# Patient Record
Sex: MALE | Race: WHITE | URBAN - METROPOLITAN AREA
[De-identification: names, ages, dates, MRNs, and addresses within clinical notes are randomized per-mention and may not be internally consistent; named-entity substitution may affect disease eponyms.]

---

## 2018-12-26 ENCOUNTER — NEW PATIENT (OUTPATIENT)
Dept: URBAN - METROPOLITAN AREA CLINIC 44 | Facility: CLINIC | Age: 68
End: 2018-12-26
Payer: MEDICARE

## 2018-12-26 DIAGNOSIS — S05.01XA INJ CONJUNCTIVA AND CORNEAL ABRASION W/O FB, RIGHT EYE, INIT: ICD-10-CM

## 2018-12-26 PROCEDURE — 92002 INTRM OPH EXAM NEW PATIENT: CPT | Performed by: OPHTHALMOLOGY

## 2018-12-26 RX ORDER — OFLOXACIN 3 MG/ML
0.3 % SOLUTION/ DROPS OPHTHALMIC
Qty: 1 | Refills: 1 | Status: ACTIVE
Start: 2018-12-26

## 2019-01-03 ENCOUNTER — FOLLOW UP ESTABLISHED (OUTPATIENT)
Dept: URBAN - METROPOLITAN AREA CLINIC 24 | Facility: CLINIC | Age: 69
End: 2019-01-03
Payer: MEDICARE

## 2019-01-03 DIAGNOSIS — S05.01XD INJ CONJUNCTIVA AND CORNEAL ABRASION W/O FB, RIGHT EYE, SUBS: Primary | ICD-10-CM

## 2019-01-03 DIAGNOSIS — H16.041 MARGINAL CORNEAL ULCER, RIGHT EYE: ICD-10-CM

## 2019-01-03 PROCEDURE — 92012 INTRM OPH EXAM EST PATIENT: CPT | Performed by: OPHTHALMOLOGY

## 2019-03-19 ENCOUNTER — HOSPITAL ENCOUNTER (OUTPATIENT)
Dept: MRI IMAGING | Facility: HOSPITAL | Age: 69
Discharge: HOME | End: 2019-03-19
Attending: OPHTHALMOLOGY
Payer: MEDICARE

## 2019-03-19 ENCOUNTER — HOSPITAL ENCOUNTER (OUTPATIENT)
Dept: GENERAL RADIOLOGY | Facility: HOSPITAL | Age: 69
Discharge: HOME | End: 2019-03-19
Attending: OPHTHALMOLOGY
Payer: MEDICARE

## 2019-03-19 DIAGNOSIS — H35.82 OCULAR ISCHEMIC SYNDROME: ICD-10-CM

## 2019-03-19 DIAGNOSIS — H53.131 SUDDEN LOSS OF VISION, RIGHT: ICD-10-CM

## 2019-03-19 DIAGNOSIS — G45.3 AMAUROSIS FUGAX: ICD-10-CM

## 2019-03-19 DIAGNOSIS — Z13.89 ENCOUNTER FOR IMAGING TO SCREEN FOR METAL PRIOR TO MRI: ICD-10-CM

## 2019-03-19 PROCEDURE — 70030 X-RAY EYE FOR FOREIGN BODY: CPT | Performed by: OPHTHALMOLOGY

## 2019-03-19 PROCEDURE — A9575 INJ GADOTERATE MEGLUMI 0.1ML: HCPCS | Performed by: RADIOLOGY

## 2019-03-19 PROCEDURE — 70549 MR ANGIOGRAPH NECK W/O&W/DYE: CPT | Performed by: OPHTHALMOLOGY

## 2019-03-19 PROCEDURE — 70544 MR ANGIOGRAPHY HEAD W/O DYE: CPT | Performed by: OPHTHALMOLOGY

## 2019-04-23 ENCOUNTER — TELEPHONE (OUTPATIENT)
Dept: SURGERY | Facility: CLINIC | Age: 69
End: 2019-04-23

## 2019-04-23 ENCOUNTER — OFFICE VISIT (OUTPATIENT)
Dept: SURGERY | Facility: CLINIC | Age: 69
End: 2019-04-23
Payer: MEDICARE

## 2019-04-23 VITALS
BODY MASS INDEX: 26.52 KG/M2 | HEIGHT: 66 IN | HEART RATE: 64 BPM | WEIGHT: 165 LBS | DIASTOLIC BLOOD PRESSURE: 76 MMHG | SYSTOLIC BLOOD PRESSURE: 122 MMHG

## 2019-04-23 DIAGNOSIS — I65.21 SYMPTOMATIC STENOSIS OF RIGHT CAROTID ARTERY WITHOUT INFARCTION: Primary | ICD-10-CM

## 2019-04-23 PROCEDURE — 99205 OFFICE O/P NEW HI 60 MIN: CPT

## 2019-04-23 RX ORDER — ATORVASTATIN CALCIUM 20 MG/1
20 TABLET, FILM COATED ORAL NIGHTLY
Qty: 30 TABLET | Refills: 3 | Status: SHIPPED | OUTPATIENT
Start: 2019-04-23 | End: 2019-09-30

## 2019-04-23 RX ORDER — CLOPIDOGREL BISULFATE 75 MG/1
75 TABLET ORAL DAILY
Qty: 30 TABLET | Refills: 3 | Status: SHIPPED | OUTPATIENT
Start: 2019-04-23 | End: 2019-09-29

## 2019-04-23 RX ORDER — ASPIRIN 81 MG/1
162 TABLET ORAL DAILY
Status: ON HOLD | COMMUNITY
End: 2019-07-14

## 2019-04-23 NOTE — TELEPHONE ENCOUNTER
Please keep for follow up:    Patient needs to have the following imaging completed: Carotid US, CTA Brain + CTA carotids, MRI brain + perfusion.       Once imaging has been completed please send patient to information with name, , diagnosis and MR to ER

## 2019-04-23 NOTE — TELEPHONE ENCOUNTER
\"We will reconvene with the patient after these imaging studies are completed and we have obtained consensus for the optimum treatment strategy at our multidisciplinary. \"    Patient informed to call to get imaging scheduled that was ordered at today's vi

## 2019-04-23 NOTE — PROGRESS NOTES
[facility]  Interventional Neuroradiology Clinic Note      Paty Leiva MD  Ophthalmology  Miranda Milian MD  Neurology  [address] [place], [place]  Primary Care      Date of Service: 4/23/2019 artery disease) 12/4/2006    3/19/2002 mild, s/p left heard catheterization, coronary angiography, left ventriculography   • Carotid artery stenosis 9/19/2006    Rt sided, at proximal and internal carotid artery, 30% involving bulb and internal carotid art tympanic membrane 10/15/2007   • Pneumonia    • Psoriasis    • Renal calculi 8/6/10    bilat, non obstructing, nephrolithiasis   • Ureterolithiasis 5/27/2008    with hydronephrosis on Lt, 5/27/2008 Lt distal ureteral calculus, s/p cystoscopy, Lt ureterosco #8/day, Disp: 240 tablet, Rfl: 0  •  aspirin EC 81 MG Oral Tab EC, Take by mouth., Disp: , Rfl:   •  DURAGESIC-100 100 MCG/HR TD PT72, 1 patch every 2 days for 30 days, 15 units- please substitue for generic, Disp: 15, Rfl: 0    Allergies:   [place] cerebrovascular risk factors including active smoking, hyperlipidemia, CAD, PVD, and cervical fusion neck surgery presents with recurrent episodes of amaurosis fugax secondary to suspected severe >70% right carotid stenosis on MRA neck study on 3/19/19. 75 mg daily, to prevent any thromboembolic or hypoperfusion related complications as well as potentially to resolve his transient amaurosis fugax symptoms.   Furthermore, we have initiated atorvastatin 20 mg daily therapy and referred him for long-term risk

## 2019-04-23 NOTE — TELEPHONE ENCOUNTER
Pt unsure what appts he needs to set up (testing, FU, etc), physician's notes not yet complete, no check out notes, please advise. Pt gives permission to leave message on his cell.

## 2019-04-23 NOTE — PROGRESS NOTES
PT here  for abnormal MRI / MRA carotids of the [facility]. Since January has been experiencing vision loss.        Review of Systems:      Hand Dominance: right  General: no symptoms reported  Neuro: no symptoms reported  Head: ringing in ears  Musculos

## 2019-04-26 ENCOUNTER — HOSPITAL ENCOUNTER (OUTPATIENT)
Dept: ULTRASOUND IMAGING | Facility: HOSPITAL | Age: 69
Discharge: HOME | End: 2019-04-26
Payer: MEDICARE

## 2019-04-26 ENCOUNTER — HOSPITAL ENCOUNTER (OUTPATIENT)
Dept: CT IMAGING | Facility: HOSPITAL | Age: 69
Discharge: HOME | End: 2019-04-26
Payer: MEDICARE

## 2019-04-26 DIAGNOSIS — I65.21 SYMPTOMATIC STENOSIS OF RIGHT CAROTID ARTERY WITHOUT INFARCTION: ICD-10-CM

## 2019-04-26 PROCEDURE — 70496 CT ANGIOGRAPHY HEAD: CPT

## 2019-04-26 PROCEDURE — 93880 EXTRACRANIAL BILAT STUDY: CPT

## 2019-04-26 PROCEDURE — 70498 CT ANGIOGRAPHY NECK: CPT

## 2019-04-26 PROCEDURE — 82565 ASSAY OF CREATININE: CPT

## 2019-04-30 ENCOUNTER — HOSPITAL ENCOUNTER (OUTPATIENT)
Dept: MRI IMAGING | Facility: HOSPITAL | Age: 69
Discharge: HOME | End: 2019-04-30
Payer: MEDICARE

## 2019-04-30 DIAGNOSIS — I65.21 SYMPTOMATIC STENOSIS OF RIGHT CAROTID ARTERY WITHOUT INFARCTION: ICD-10-CM

## 2019-04-30 PROCEDURE — 70553 MRI BRAIN STEM W/O & W/DYE: CPT

## 2019-04-30 PROCEDURE — A9575 INJ GADOTERATE MEGLUMI 0.1ML: HCPCS

## 2019-05-09 NOTE — TELEPHONE ENCOUNTER
Patient completed imaging.  Message sent to Darcie Rodriguez at [facility] to be added on to the conference discussion list.

## 2019-05-15 ENCOUNTER — TELEPHONE (OUTPATIENT)
Dept: SURGERY | Facility: CLINIC | Age: 69
End: 2019-05-15

## 2019-05-15 NOTE — TELEPHONE ENCOUNTER
Pt wants results. Now has lost hearing, has low buzzing, constant. Would like someone to call him with the results. Dr. Baldo Mauro asked him at the visit about his hearing so he is concerned. Please call this afternoon [phone].

## 2019-05-15 NOTE — TELEPHONE ENCOUNTER
Called patient back regarding hearing loss. He stated he has had some hearing loss to his left ear for the past hour along with low humming.   He is concerned because Dr. Melecio Pang had asked him about his hearing at his last visit and at that time, he was not

## 2019-05-16 ENCOUNTER — TELEPHONE (OUTPATIENT)
Dept: SURGERY | Facility: CLINIC | Age: 69
End: 2019-05-16

## 2019-05-16 NOTE — TELEPHONE ENCOUNTER
ANTELMO. Spoke with Lacho [place] who stated patient to f/u next available to discuss results and recommendations.

## 2019-05-16 NOTE — TELEPHONE ENCOUNTER
Pt called on call  last night, pt was having hearing issues, humming, then muscles. Then pt was having numbness in face on L side for a couple of minutes.   Pt states he still doesn't have  Test results & Dr Elisabeth Lu next available is not until 6/4/19

## 2019-05-16 NOTE — TELEPHONE ENCOUNTER
HI, Please call and arrange for patient to be seen in clinic with Dr. Zeus Holden. We will evaluated symptoms and also discuss with patient conference recommendations.     Thanks

## 2019-05-22 ENCOUNTER — OFFICE VISIT (OUTPATIENT)
Dept: SURGERY | Facility: CLINIC | Age: 69
End: 2019-05-22
Payer: MEDICARE

## 2019-05-22 ENCOUNTER — TELEPHONE (OUTPATIENT)
Dept: SURGERY | Facility: CLINIC | Age: 69
End: 2019-05-22

## 2019-05-22 VITALS — HEART RATE: 64 BPM | SYSTOLIC BLOOD PRESSURE: 108 MMHG | DIASTOLIC BLOOD PRESSURE: 70 MMHG | RESPIRATION RATE: 18 BRPM

## 2019-05-22 DIAGNOSIS — I65.21 SYMPTOMATIC STENOSIS OF RIGHT CAROTID ARTERY WITHOUT INFARCTION: Primary | ICD-10-CM

## 2019-05-22 DIAGNOSIS — Z01.818 PRE-OP TESTING: ICD-10-CM

## 2019-05-22 PROCEDURE — 99215 OFFICE O/P EST HI 40 MIN: CPT

## 2019-05-22 NOTE — TELEPHONE ENCOUNTER
Schedule patient for a Carotid Angiogram with stenting. Will need PA due to high risk procedure with Medicare. Schedule appointment for first available.

## 2019-05-22 NOTE — PROGRESS NOTES
[facility]  Interventional Neuroradiology Clinic Note        Gen Weldon MD  Ophthalmology  [facility] RAINA Alfonso MD  Neurology  [address] [place]  Primary Care        Date of Service: 5/22/2 starting clopidogrel, his amauraosis fugax has resolved. However, he describes episode of left facial numbness/tingling  that lasted for 1 minute last week.  Additionally, he noted left sided high pitched non-pulsatile tinnitus and some subjective hearing l chronic venous insufficiency with corona phlebectatica, hyperpigmentation, ankle swelling, dermatitis, lipodermatosclerosis, and healed ulceration   • CRPS (complex regional pain syndrome type II) 9/10/2010   • Crush injury 1999     Lt thigh   • Depression UNLISTED         20 surgeries on left leg, 11/2005 incision and debridement Lt leg wound, 1999 Lt leg I&D   • Cystoscopy, Lt ureteroscopy, stone extraction, Lt ureteral stent insertion   5/27/08   • Left leg abcess removal       • Cystoscopy, stent removal of carotid bruits, lymphadenopathy, or masses. The thyroid is midline.   The patient is  alert, awake, and oriented with normal intelligence, memory, language, and judgement.  On cranial nerve examination, full visual fields are noted by confrontational tian clopidogrel, we will attempt to transition him to ticagrelor or prasugrel.      We again counseled the patient on the  benefits of carotid revascularization for symptomatic > 70% stenoses based on the NASCET trial, ~17% absolute risk reduction of ipsilater ischemic/hemorrhagic stroke prevention including monitoring for hypertension and diabetes, continuing dual antiplatelets, hydration, and statin anticholesterolemic therapy.  Both aspirin and statins may have inherent anti-inflammatory effects in stabilizing

## 2019-05-24 NOTE — TELEPHONE ENCOUNTER
Patient returned our call and decided to take June 27th at 9:00 am for his surgery. While speaking to patient he was in the middle of driving and asked that we alice him back later to go over surgery intructions.

## 2019-05-24 NOTE — TELEPHONE ENCOUNTER
LMTCB. Patient to be scheduled for the below procedure and review instructions.     Patient scheduled for carotid angiogram with stenting on date/time TBD with Dr. Bella Dee.     Discussed instructions with patient      · Nothing to eat or drink after midnight

## 2019-05-28 NOTE — TELEPHONE ENCOUNTER
Called patient to give him instructions for his surgery. Also, sent patient a lync to set up MyChart.   Surgery scheduled for June 27th at 9:00 am             Letter sent to PCP  Is a current everyday smoker  Medicare, PA not required

## 2019-05-29 NOTE — TELEPHONE ENCOUNTER
Patient has appointment scheduled for 6/4/19 for medical clearance. Will check back at that time for status of clearance.

## 2019-06-07 ENCOUNTER — HOSPITAL ENCOUNTER (OUTPATIENT)
Dept: GENERAL RADIOLOGY | Facility: HOSPITAL | Age: 69
Discharge: HOME | End: 2019-06-07
Payer: MEDICARE

## 2019-06-07 ENCOUNTER — LAB ENCOUNTER (OUTPATIENT)
Dept: LAB | Facility: HOSPITAL | Age: 69
End: 2019-06-07
Payer: MEDICARE

## 2019-06-07 DIAGNOSIS — I65.21 SYMPTOMATIC STENOSIS OF RIGHT CAROTID ARTERY WITHOUT INFARCTION: ICD-10-CM

## 2019-06-07 DIAGNOSIS — Z01.818 PRE-OP TESTING: ICD-10-CM

## 2019-06-07 DIAGNOSIS — Z00.00 ROUTINE GENERAL MEDICAL EXAMINATION AT A HEALTH CARE FACILITY: ICD-10-CM

## 2019-06-07 DIAGNOSIS — E03.9 PRIMARY HYPOTHYROIDISM: ICD-10-CM

## 2019-06-07 DIAGNOSIS — E78.5 HYPERLIPIDEMIA, UNSPECIFIED HYPERLIPIDEMIA TYPE: ICD-10-CM

## 2019-06-07 PROCEDURE — 36415 COLL VENOUS BLD VENIPUNCTURE: CPT

## 2019-06-07 PROCEDURE — 80061 LIPID PANEL: CPT

## 2019-06-07 PROCEDURE — 85610 PROTHROMBIN TIME: CPT

## 2019-06-07 PROCEDURE — 84439 ASSAY OF FREE THYROXINE: CPT

## 2019-06-07 PROCEDURE — 80053 COMPREHEN METABOLIC PANEL: CPT

## 2019-06-07 PROCEDURE — 85576 BLOOD PLATELET AGGREGATION: CPT

## 2019-06-07 PROCEDURE — 85025 COMPLETE CBC W/AUTO DIFF WBC: CPT

## 2019-06-07 PROCEDURE — 71046 X-RAY EXAM CHEST 2 VIEWS: CPT

## 2019-06-07 PROCEDURE — 84443 ASSAY THYROID STIM HORMONE: CPT

## 2019-06-07 PROCEDURE — 85730 THROMBOPLASTIN TIME PARTIAL: CPT

## 2019-06-10 ENCOUNTER — TELEPHONE (OUTPATIENT)
Dept: SURGERY | Facility: CLINIC | Age: 69
End: 2019-06-10

## 2019-06-10 NOTE — TELEPHONE ENCOUNTER
Ran aspirin platelet inhibition test. Initia result showed less than 350.  They are now resulting it at 379 corrected

## 2019-06-10 NOTE — TELEPHONE ENCOUNTER
Noted, routed to [facility]    Ran aspirin platelet inhibition test. Initia result showed less than 350.  They are now resulting it at 379 corrected

## 2019-06-14 NOTE — TELEPHONE ENCOUNTER
Spoke with PCP office for medical clearance as office note is still pending. Message is being sent to .

## 2019-06-20 NOTE — TELEPHONE ENCOUNTER
Per PCP- on 6/4/19: \" ECG, labs are in acceptable range for surgery.       After examining  and reviewing the preoperative testing, Makayal Samuel has an acceptable risk of surgery and may proceed with surgery as planned.   Patient is instructed to start le

## 2019-06-25 ENCOUNTER — APPOINTMENT (OUTPATIENT)
Dept: GENERAL RADIOLOGY | Facility: HOSPITAL | Age: 69
End: 2019-06-25
Attending: EMERGENCY MEDICINE
Payer: MEDICARE

## 2019-06-25 ENCOUNTER — APPOINTMENT (OUTPATIENT)
Dept: CT IMAGING | Facility: HOSPITAL | Age: 69
End: 2019-06-25
Attending: EMERGENCY MEDICINE
Payer: MEDICARE

## 2019-06-25 ENCOUNTER — HOSPITAL ENCOUNTER (EMERGENCY)
Facility: HOSPITAL | Age: 69
Discharge: HOME | End: 2019-06-25
Attending: EMERGENCY MEDICINE
Payer: MEDICARE

## 2019-06-25 VITALS — HEIGHT: 67 IN | BODY MASS INDEX: 26.06 KG/M2 | WEIGHT: 166 LBS

## 2019-06-25 VITALS
OXYGEN SATURATION: 98 % | BODY MASS INDEX: 25.39 KG/M2 | DIASTOLIC BLOOD PRESSURE: 78 MMHG | WEIGHT: 158 LBS | TEMPERATURE: 97 F | RESPIRATION RATE: 18 BRPM | SYSTOLIC BLOOD PRESSURE: 116 MMHG | HEART RATE: 67 BPM | HEIGHT: 66 IN

## 2019-06-25 DIAGNOSIS — S80.12XA HEMATOMA OF LEG, LEFT, INITIAL ENCOUNTER: Primary | ICD-10-CM

## 2019-06-25 PROCEDURE — 96374 THER/PROPH/DIAG INJ IV PUSH: CPT

## 2019-06-25 PROCEDURE — 85025 COMPLETE CBC W/AUTO DIFF WBC: CPT | Performed by: EMERGENCY MEDICINE

## 2019-06-25 PROCEDURE — 99285 EMERGENCY DEPT VISIT HI MDM: CPT

## 2019-06-25 PROCEDURE — 99284 EMERGENCY DEPT VISIT MOD MDM: CPT

## 2019-06-25 PROCEDURE — 73590 X-RAY EXAM OF LOWER LEG: CPT | Performed by: EMERGENCY MEDICINE

## 2019-06-25 PROCEDURE — 70450 CT HEAD/BRAIN W/O DYE: CPT | Performed by: EMERGENCY MEDICINE

## 2019-06-25 PROCEDURE — 73552 X-RAY EXAM OF FEMUR 2/>: CPT | Performed by: EMERGENCY MEDICINE

## 2019-06-25 RX ORDER — KETOROLAC TROMETHAMINE 30 MG/ML
30 INJECTION, SOLUTION INTRAMUSCULAR; INTRAVENOUS ONCE
Status: DISCONTINUED | OUTPATIENT
Start: 2019-06-25 | End: 2019-06-25

## 2019-06-25 RX ORDER — DIAZEPAM 5 MG/1
5 TABLET ORAL ONCE
Status: COMPLETED | OUTPATIENT
Start: 2019-06-25 | End: 2019-06-25

## 2019-06-25 RX ORDER — ACETAMINOPHEN 500 MG
1000 TABLET ORAL ONCE
Status: COMPLETED | OUTPATIENT
Start: 2019-06-25 | End: 2019-06-25

## 2019-06-25 RX ORDER — MORPHINE SULFATE 4 MG/ML
4 INJECTION, SOLUTION INTRAMUSCULAR; INTRAVENOUS ONCE
Status: COMPLETED | OUTPATIENT
Start: 2019-06-25 | End: 2019-06-25
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
## 2019-06-26 NOTE — TELEPHONE ENCOUNTER
Patient called back and reviewed below ER note. Patient will call our office back to reschedule once he receives clearance from orthopedics. Message post-poned 2 weeks.

## 2019-06-26 NOTE — ED NOTES
Patient resting on cart. Pt is alert, skin p/w/d, breathing non labored and with ease. Pt educated on ordered medications. Pt repositioned for comfort and head of bed lowered. Ice packs in place to leg. Family at bedside.

## 2019-06-26 NOTE — TELEPHONE ENCOUNTER
LM for patient to CB to reschedule procedure. Patient was in ER yesterday. Procedure for tomorrow to be cancelled. Per -ER note: \"CT scan of the brain was negative. Left femur/tib-fib x-rays were also negative.   I discussed case with

## 2019-06-27 ENCOUNTER — HOSPITAL ENCOUNTER (OUTPATIENT)
Dept: INTERVENTIONAL RADIOLOGY/VASCULAR | Facility: HOSPITAL | Age: 69
Discharge: HOME | End: 2019-06-27
Payer: MEDICARE

## 2019-06-27 NOTE — PROGRESS NOTES
Patient refuses LEVOTHYROXINE; he doesn't want to take it says it makes him feel sluggish. .. Patient informed.

## 2019-06-28 NOTE — TELEPHONE ENCOUNTER
Patient stopped by the office today and stated he just saw ortho-KAI Jaramillo/Dr.Lombardi who stated patient cleared for procedure and to restart plavix. Informed patient I will need notes from Ortho office for confirmation of above information.

## 2019-07-01 NOTE — TELEPHONE ENCOUNTER
Spoke with Dr.Lombardi's office to verify if patient was given the ok to restart his plavix as it is not indicated in the office visit note from 6/28/19. Dr.Lombardi's front office will send message to clinical staff to verify plavix.

## 2019-07-01 NOTE — TELEPHONE ENCOUNTER
Spoke with patient who was rescheduled to 7/11/19 at 8 am.  Patient was instructed to complete Aspirin assay and P2Y12 blood work the day prior to procedure.

## 2019-07-01 NOTE — TELEPHONE ENCOUNTER
Per Dr. Michael Levi office, patient is cleared to resume his Plavix. During his OV, it was noted that his hematoma has gone camila dramatically.

## 2019-07-10 ENCOUNTER — ANESTHESIA EVENT (OUTPATIENT)
Dept: INTERVENTIONAL RADIOLOGY/VASCULAR | Facility: HOSPITAL | Age: 69
DRG: 036 | End: 2019-07-10
Payer: MEDICARE

## 2019-07-10 ENCOUNTER — LAB ENCOUNTER (OUTPATIENT)
Dept: LAB | Facility: HOSPITAL | Age: 69
DRG: 036 | End: 2019-07-10
Payer: MEDICARE

## 2019-07-10 DIAGNOSIS — I65.29 CAROTID STENOSIS: ICD-10-CM

## 2019-07-10 DIAGNOSIS — Z01.818 PRE-OP TESTING: ICD-10-CM

## 2019-07-10 DIAGNOSIS — I65.21 SYMPTOMATIC STENOSIS OF RIGHT CAROTID ARTERY WITHOUT INFARCTION: ICD-10-CM

## 2019-07-10 LAB
ANTIBODY SCREEN: NEGATIVE
ASPIRIN PLT INHIBITION: 392 ARU (ref 620–672)
P2Y12 REACTION UNITS: 83 PRU
RH BLOOD TYPE: POSITIVE

## 2019-07-10 PROCEDURE — 86901 BLOOD TYPING SEROLOGIC RH(D): CPT

## 2019-07-10 PROCEDURE — 86850 RBC ANTIBODY SCREEN: CPT

## 2019-07-10 PROCEDURE — 36415 COLL VENOUS BLD VENIPUNCTURE: CPT

## 2019-07-10 PROCEDURE — 86900 BLOOD TYPING SEROLOGIC ABO: CPT

## 2019-07-10 PROCEDURE — 85576 BLOOD PLATELET AGGREGATION: CPT

## 2019-07-10 RX ORDER — MORPHINE SULFATE 60 MG/1
60 TABLET, FILM COATED, EXTENDED RELEASE ORAL 3 TIMES DAILY
COMMUNITY
End: 2020-06-04

## 2019-07-10 NOTE — TELEPHONE ENCOUNTER
Spoke with patient who stated he was on his way to the hospital to complete blood work. Nothing further needed.

## 2019-07-11 ENCOUNTER — ANESTHESIA (OUTPATIENT)
Dept: INTERVENTIONAL RADIOLOGY/VASCULAR | Facility: HOSPITAL | Age: 69
DRG: 036 | End: 2019-07-11
Payer: MEDICARE

## 2019-07-11 ENCOUNTER — HOSPITAL ENCOUNTER (INPATIENT)
Dept: INTERVENTIONAL RADIOLOGY/VASCULAR | Facility: HOSPITAL | Age: 69
LOS: 4 days | Discharge: HOME | DRG: 036 | End: 2019-07-15
Payer: MEDICARE

## 2019-07-11 DIAGNOSIS — I65.21 SYMPTOMATIC STENOSIS OF RIGHT CAROTID ARTERY WITHOUT INFARCTION: ICD-10-CM

## 2019-07-11 LAB
ANION GAP SERPL CALC-SCNC: 2 MMOL/L (ref 0–18)
BASOPHILS # BLD AUTO: 0.05 X10(3) UL (ref 0–0.2)
BASOPHILS NFR BLD AUTO: 0.6 %
BUN BLD-MCNC: 20 MG/DL (ref 7–18)
BUN/CREAT SERPL: 26 (ref 10–20)
CALCIUM BLD-MCNC: 9 MG/DL (ref 8.5–10.1)
CHLORIDE SERPL-SCNC: 107 MMOL/L (ref 98–112)
CO2 SERPL-SCNC: 29 MMOL/L (ref 21–32)
CREAT BLD-MCNC: 0.77 MG/DL (ref 0.7–1.3)
DEPRECATED RDW RBC AUTO: 48.2 FL (ref 35.1–46.3)
EOSINOPHIL # BLD AUTO: 0.25 X10(3) UL (ref 0–0.7)
EOSINOPHIL NFR BLD AUTO: 3.1 %
ERYTHROCYTE [DISTWIDTH] IN BLOOD BY AUTOMATED COUNT: 13.6 % (ref 11–15)
GLUCOSE BLD-MCNC: 82 MG/DL (ref 70–99)
GLUCOSE BLD-MCNC: 86 MG/DL (ref 70–99)
HCT VFR BLD AUTO: 39.4 % (ref 39–53)
HGB BLD-MCNC: 13.6 G/DL (ref 13–17.5)
IMM GRANULOCYTES # BLD AUTO: 0.03 X10(3) UL (ref 0–1)
IMM GRANULOCYTES NFR BLD: 0.4 %
INR: 0.9 (ref 0.8–1.3)
ISTAT ACTIVATED CLOTTING TIME: 136 SECONDS (ref 74–137)
ISTAT ACTIVATED CLOTTING TIME: 197 SECONDS (ref 74–137)
ISTAT BLOOD GAS BASE DEFICIT: -1 MMOL/L
ISTAT BLOOD GAS HCO3: 24.3 MEQ/L (ref 22–26)
ISTAT BLOOD GAS O2 SATURATION: 98 % (ref 92–100)
ISTAT BLOOD GAS PCO2: 43.7 MMHG (ref 35–45)
ISTAT BLOOD GAS PH: 7.35 (ref 7.35–7.45)
ISTAT BLOOD GAS PO2: 118 MMHG (ref 80–105)
ISTAT BLOOD GAS TCO2: 26 MMOL/L (ref 22–32)
ISTAT HEMATOCRIT: 37 % (ref 37–53)
ISTAT IONIZED CALCIUM: 1.19 MMOL/L (ref 1.12–1.32)
ISTAT POTASSIUM: 3.6 MMOL/L (ref 3.6–5.1)
ISTAT SODIUM: 141 MMOL/L (ref 136–145)
LYMPHOCYTES # BLD AUTO: 1.73 X10(3) UL (ref 1–4)
LYMPHOCYTES NFR BLD AUTO: 21.6 %
MCH RBC QN AUTO: 33.4 PG (ref 26–34)
MCHC RBC AUTO-ENTMCNC: 34.5 G/DL (ref 31–37)
MCV RBC AUTO: 96.8 FL (ref 80–100)
MONOCYTES # BLD AUTO: 0.73 X10(3) UL (ref 0.1–1)
MONOCYTES NFR BLD AUTO: 9.1 %
NEUTROPHILS # BLD AUTO: 5.22 X10 (3) UL (ref 1.5–7.7)
NEUTROPHILS # BLD AUTO: 5.22 X10(3) UL (ref 1.5–7.7)
NEUTROPHILS NFR BLD AUTO: 65.2 %
OSMOLALITY SERPL CALC.SUM OF ELEC: 288 MOSM/KG (ref 275–295)
PLATELET # BLD AUTO: 213 10(3)UL (ref 150–450)
POTASSIUM SERPL-SCNC: 4.1 MMOL/L (ref 3.5–5.1)
RBC # BLD AUTO: 4.07 X10(6)UL (ref 3.8–5.8)
SODIUM SERPL-SCNC: 138 MMOL/L (ref 136–145)
WBC # BLD AUTO: 8 X10(3) UL (ref 4–11)

## 2019-07-11 PROCEDURE — 037H3DZ DILATION OF RIGHT COMMON CAROTID ARTERY WITH INTRALUMINAL DEVICE, PERCUTANEOUS APPROACH: ICD-10-PCS

## 2019-07-11 PROCEDURE — B41F1ZZ FLUOROSCOPY OF RIGHT LOWER EXTREMITY ARTERIES USING LOW OSMOLAR CONTRAST: ICD-10-PCS

## 2019-07-11 PROCEDURE — 99222 1ST HOSP IP/OBS MODERATE 55: CPT | Performed by: STUDENT IN AN ORGANIZED HEALTH CARE EDUCATION/TRAINING PROGRAM

## 2019-07-11 PROCEDURE — 99223 1ST HOSP IP/OBS HIGH 75: CPT | Performed by: OTHER

## 2019-07-11 PROCEDURE — 037K3ZZ DILATION OF RIGHT INTERNAL CAROTID ARTERY, PERCUTANEOUS APPROACH: ICD-10-PCS

## 2019-07-11 RX ORDER — LIDOCAINE HYDROCHLORIDE 10 MG/ML
INJECTION, SOLUTION INFILTRATION; PERINEURAL
Status: COMPLETED
Start: 2019-07-11 | End: 2019-07-11

## 2019-07-11 RX ORDER — FAMOTIDINE 10 MG/ML
20 INJECTION, SOLUTION INTRAVENOUS DAILY
Status: DISCONTINUED | OUTPATIENT
Start: 2019-07-11 | End: 2019-07-12

## 2019-07-11 RX ORDER — LABETALOL HYDROCHLORIDE 5 MG/ML
10 INJECTION, SOLUTION INTRAVENOUS EVERY 10 MIN PRN
Status: DISCONTINUED | OUTPATIENT
Start: 2019-07-11 | End: 2019-07-13

## 2019-07-11 RX ORDER — FAMOTIDINE 20 MG/1
20 TABLET ORAL DAILY
Status: DISCONTINUED | OUTPATIENT
Start: 2019-07-11 | End: 2019-07-15

## 2019-07-11 RX ORDER — SODIUM CHLORIDE 9 MG/ML
INJECTION, SOLUTION INTRAVENOUS ONCE
Status: COMPLETED | OUTPATIENT
Start: 2019-07-11 | End: 2019-07-11

## 2019-07-11 RX ORDER — MORPHINE SULFATE 4 MG/ML
1 INJECTION, SOLUTION INTRAMUSCULAR; INTRAVENOUS EVERY 2 HOUR PRN
Status: DISCONTINUED | OUTPATIENT
Start: 2019-07-11 | End: 2019-07-15

## 2019-07-11 RX ORDER — PHENYLEPHRINE HCL IN 0.9% NACL 50MG/250ML
PLASTIC BAG, INJECTION (ML) INTRAVENOUS
Status: COMPLETED
Start: 2019-07-11 | End: 2019-07-11

## 2019-07-11 RX ORDER — MORPHINE SULFATE 4 MG/ML
2 INJECTION, SOLUTION INTRAMUSCULAR; INTRAVENOUS EVERY 2 HOUR PRN
Status: DISCONTINUED | OUTPATIENT
Start: 2019-07-11 | End: 2019-07-15

## 2019-07-11 RX ORDER — HEPARIN SODIUM 5000 [USP'U]/ML
INJECTION, SOLUTION INTRAVENOUS; SUBCUTANEOUS
Status: COMPLETED
Start: 2019-07-11 | End: 2019-07-11

## 2019-07-11 RX ORDER — HYDROCODONE BITARTRATE AND ACETAMINOPHEN 5; 325 MG/1; MG/1
1 TABLET ORAL EVERY 6 HOURS PRN
Status: DISCONTINUED | OUTPATIENT
Start: 2019-07-11 | End: 2019-07-12

## 2019-07-11 RX ORDER — ASPIRIN 81 MG/1
81 TABLET, CHEWABLE ORAL DAILY
Status: DISCONTINUED | OUTPATIENT
Start: 2019-07-12 | End: 2019-07-15

## 2019-07-11 RX ORDER — ACETAMINOPHEN 650 MG/1
650 SUPPOSITORY RECTAL EVERY 4 HOURS PRN
Status: DISCONTINUED | OUTPATIENT
Start: 2019-07-11 | End: 2019-07-15

## 2019-07-11 RX ORDER — HYDROCODONE BITARTRATE AND ACETAMINOPHEN 5; 325 MG/1; MG/1
2 TABLET ORAL EVERY 6 HOURS PRN
Status: DISCONTINUED | OUTPATIENT
Start: 2019-07-11 | End: 2019-07-12

## 2019-07-11 RX ORDER — METOCLOPRAMIDE HYDROCHLORIDE 5 MG/ML
10 INJECTION INTRAMUSCULAR; INTRAVENOUS EVERY 8 HOURS PRN
Status: DISCONTINUED | OUTPATIENT
Start: 2019-07-11 | End: 2019-07-15

## 2019-07-11 RX ORDER — CLOPIDOGREL BISULFATE 75 MG/1
75 TABLET ORAL DAILY
Status: DISCONTINUED | OUTPATIENT
Start: 2019-07-12 | End: 2019-07-15

## 2019-07-11 RX ORDER — SODIUM CHLORIDE 9 MG/ML
INJECTION, SOLUTION INTRAVENOUS CONTINUOUS
Status: DISCONTINUED | OUTPATIENT
Start: 2019-07-11 | End: 2019-07-15

## 2019-07-11 RX ORDER — VERAPAMIL HYDROCHLORIDE 2.5 MG/ML
INJECTION, SOLUTION INTRAVENOUS
Status: COMPLETED
Start: 2019-07-11 | End: 2019-07-11

## 2019-07-11 RX ORDER — ONDANSETRON 2 MG/ML
4 INJECTION INTRAMUSCULAR; INTRAVENOUS EVERY 6 HOURS PRN
Status: DISCONTINUED | OUTPATIENT
Start: 2019-07-11 | End: 2019-07-15

## 2019-07-11 RX ORDER — ACETAMINOPHEN 325 MG/1
650 TABLET ORAL EVERY 4 HOURS PRN
Status: DISCONTINUED | OUTPATIENT
Start: 2019-07-11 | End: 2019-07-15

## 2019-07-11 RX ORDER — MORPHINE SULFATE 30 MG/1
60 TABLET, FILM COATED, EXTENDED RELEASE ORAL EVERY 8 HOURS SCHEDULED
Status: DISCONTINUED | OUTPATIENT
Start: 2019-07-11 | End: 2019-07-15

## 2019-07-11 RX ORDER — PHENYLEPHRINE HCL IN 0.9% NACL 50MG/250ML
PLASTIC BAG, INJECTION (ML) INTRAVENOUS CONTINUOUS
Status: DISCONTINUED | OUTPATIENT
Start: 2019-07-11 | End: 2019-07-12

## 2019-07-11 RX ADMIN — PHENYLEPHRINE HCL IN 0.9% NACL 65 MCG/MIN: 50MG/250ML PLASTIC BAG, INJECTION (ML) INTRAVENOUS at 20:00:00

## 2019-07-11 RX ADMIN — PHENYLEPHRINE HCL IN 0.9% NACL 65 MCG/MIN: 50MG/250ML PLASTIC BAG, INJECTION (ML) INTRAVENOUS at 19:40:00

## 2019-07-11 RX ADMIN — SODIUM CHLORIDE: 9 INJECTION, SOLUTION INTRAVENOUS at 22:18:00

## 2019-07-11 RX ADMIN — PHENYLEPHRINE HCL IN 0.9% NACL 75 MCG/MIN: 50MG/250ML PLASTIC BAG, INJECTION (ML) INTRAVENOUS at 21:00:00

## 2019-07-11 RX ADMIN — PHENYLEPHRINE HCL IN 0.9% NACL 50 MCG/MIN: 50MG/250ML PLASTIC BAG, INJECTION (ML) INTRAVENOUS at 19:31:00

## 2019-07-11 RX ADMIN — MORPHINE SULFATE 60 MG: 30 TABLET, FILM COATED, EXTENDED RELEASE ORAL at 17:57:00

## 2019-07-11 RX ADMIN — MORPHINE SULFATE 60 MG: 30 TABLET, FILM COATED, EXTENDED RELEASE ORAL at 21:57:00

## 2019-07-11 RX ADMIN — PHENYLEPHRINE HCL IN 0.9% NACL 50 MCG/MIN: 50MG/250ML PLASTIC BAG, INJECTION (ML) INTRAVENOUS at 15:30:00

## 2019-07-11 RX ADMIN — PHENYLEPHRINE HCL IN 0.9% NACL 30 MCG/MIN: 50MG/250ML PLASTIC BAG, INJECTION (ML) INTRAVENOUS at 14:31:00

## 2019-07-11 RX ADMIN — PHENYLEPHRINE HCL IN 0.9% NACL 70 MCG/MIN: 50MG/250ML PLASTIC BAG, INJECTION (ML) INTRAVENOUS at 14:56:00

## 2019-07-11 RX ADMIN — PHENYLEPHRINE HCL IN 0.9% NACL 25 MCG: 50MG/250ML PLASTIC BAG, INJECTION (ML) INTRAVENOUS at 14:26:00

## 2019-07-11 RX ADMIN — HYDROCODONE BITARTRATE AND ACETAMINOPHEN 1 TABLET: 5; 325 TABLET ORAL at 20:09:00

## 2019-07-11 RX ADMIN — PHENYLEPHRINE HCL IN 0.9% NACL 20 MCG/MIN: 50MG/250ML PLASTIC BAG, INJECTION (ML) INTRAVENOUS at 14:28:00

## 2019-07-11 RX ADMIN — PHENYLEPHRINE HCL IN 0.9% NACL 50 MCG/MIN: 50MG/250ML PLASTIC BAG, INJECTION (ML) INTRAVENOUS at 14:42:00

## 2019-07-11 RX ADMIN — PHENYLEPHRINE HCL IN 0.9% NACL 75 MCG/MIN: 50MG/250ML PLASTIC BAG, INJECTION (ML) INTRAVENOUS at 19:00:00

## 2019-07-11 RX ADMIN — PHENYLEPHRINE HCL IN 0.9% NACL 50 MCG/MIN: 50MG/250ML PLASTIC BAG, INJECTION (ML) INTRAVENOUS at 23:30:00

## 2019-07-11 RX ADMIN — PHENYLEPHRINE HCL IN 0.9% NACL 40 MCG/MIN: 50MG/250ML PLASTIC BAG, INJECTION (ML) INTRAVENOUS at 14:37:00

## 2019-07-11 RX ADMIN — PHENYLEPHRINE HCL IN 0.9% NACL 60 MCG/MIN: 50MG/250ML PLASTIC BAG, INJECTION (ML) INTRAVENOUS at 19:45:00

## 2019-07-11 RX ADMIN — PHENYLEPHRINE HCL IN 0.9% NACL 60 MCG/MIN: 50MG/250ML PLASTIC BAG, INJECTION (ML) INTRAVENOUS at 14:46:00

## 2019-07-11 RX ADMIN — FAMOTIDINE 20 MG: 20 TABLET ORAL at 12:57:00

## 2019-07-11 RX ADMIN — SODIUM CHLORIDE: 9 INJECTION, SOLUTION INTRAVENOUS at 12:57:00

## 2019-07-11 RX ADMIN — PHENYLEPHRINE HCL IN 0.9% NACL 60 MCG/MIN: 50MG/250ML PLASTIC BAG, INJECTION (ML) INTRAVENOUS at 23:48:00

## 2019-07-11 NOTE — ANESTHESIA PREPROCEDURE EVALUATION
PRE-OP EVALUATION    Patient Name: Mery Crump    Pre-op Diagnosis: * No surgery found *    * No surgery found *    * Surgery not found *    Pre-op vitals reviewed. Body mass index is 24.75 kg/m². Current medications reviewed.   [address] [employer] I&D   • OTHER SURGICAL HISTORY  5/27/08    cystoscopy, Lt ureteroscopy, stone extraction, Lt ureteral stent insertion   • OTHER SURGICAL HISTORY      left leg abcess removal   • OTHER SURGICAL HISTORY  6/20/2008    cystoscopy, stent removal - indwelling ur dental damage, sore throat and allergic/ adverse reactions to medications administered. Questions answered and patient wishes to proceed. Consent signed.       Present on Admission:  **None**

## 2019-07-11 NOTE — CONSULTS
800 11 Patient Status:  Inpatient    1950 MRN [id number]   [facility] 6NE-A Attending Jayson De La Torre MD, PhD   Tara Loza # 0 PCP Yung Viera MD     Reason for consult: Medical Mgt    Requeste producing physical evidence of Stage III chronic venous insufficiency with corona phlebectatica, hyperpigmentation, ankle swelling, dermatitis, lipodermatosclerosis, and healed ulceration   • Cough 6/18/2011    hyperinflated lungs   • Coughing up blood 200 left tympanic membrane 10/15/2007   • Peripheral neuropathy     hx    • Pneumonia    • Psoriasis     per pt.    • PVD (peripheral vascular disease) ([facility] [address]     left leg   • Reflex sympathetic dystrophy of the lower limb 4/15/2008   • Renal calculi 8/6/10    b neck surgery   • OTHER SURGICAL HISTORY  3/19/02    left heard catheterization, coronary angiography, left ventriculography   • OTHER SURGICAL HISTORY      venous surgery LLE   • SKIN SURGERY      skin grafting left leg       Social History:  reports th 99%   BMI 24.75 kg/m²   General: No acute distress. Alert and oriented x 3. HEENT: Normocephalic atraumatic. Moist mucous membranes. EOM-I. PERRLA. Anicteric. Neck: No lymphadenopathy. No JVD. No carotid bruits.   Respiratory: Clear to auscultation bilate

## 2019-07-11 NOTE — PROCEDURES
[facility]  Interventional Neuroradiology Procedure Note      Procedure: Right Carotid Angioplasty/Stenting    Pre-Op Diagnosis: symptomatic/high risk YAS origin stenosis    Post-Op Diagnosis: s/p angioplasty/stenting    Surgeon: Ashtyn De Leon MD, P

## 2019-07-11 NOTE — CONSULTS
[address] [address]   Neurology; INITIAL Consultation VISIT  2019, 4:15 PM     Ashli Groves Patient Status:  Inpatient    1950 MRN [id number]   [facility] 6NE-A Attending Fercho Cameron MD, PhD     PCP Paul Rodriguez 10/8/2012   • Chest x-ray abnormality 9/26/11    mild hyperinflation of lungs   • Chills 2011   • Chronic back pain     degenerative thoracic lumbar spinal disease with history of annular tear   • Chronic leg pain     s/p crush injury and multiple surgerie Lumbar radiculopathy 3/8/2010   • Lumbar spinal stenosis 4/5/2010    degenerative thoracic lumbar spinal disease, w hx of annual tear   • Mononeuritis of lower limb, unspecified 12/26/2005   • Myofascial pain syndrome 7/13/2000    supporting joint with pro • HERNIA SURGERY  2010    inguinal hernia repair   • LEG/ANKLE SURGERY PROC UNLISTED      20 surgeries on left leg, 11/2005 incision and debridement Lt leg wound, 1999 Lt leg I&D   • OTHER SURGICAL HISTORY  5/27/08    cystoscopy, Lt ureteroscopy, stone e • famoTIDine  20 mg Oral Daily    Or   • famoTIDine  20 mg Intravenous Daily   • [START ON 7/12/2019] aspirin  81 mg Oral Daily   • [START ON 7/12/2019] Clopidogrel Bisulfate  75 mg Oral Daily   • morphINE Sulfate ER  60 mg Oral Q8H [address]     PRN:  ace x10(6)uL    HGB 13.6 13.0 - 17.5 g/dL    HCT 39.4 39.0 - 53.0 %    .0 150.0 - 450.0 10(3)uL    MCV 96.8 80.0 - 100.0 fL    MCH 33.4 26.0 - 34.0 pg    MCHC 34.5 31.0 - 37.0 g/dL    RDW 13.6 11.0 - 15.0 %    RDW-SD 48.2 (H) 35.1 - 46.3 fL    Neutrophi (which he is not). Symptomatic treatment with Pressor and Atropine as needed. Also might need temporary pacing if resistant.     We will use Atropine 0.5 mg iv q 3-5 minutes (max 3 mg) and if not better Cardiology consult      Man Perez

## 2019-07-11 NOTE — PROGRESS NOTES
Short ANDREA Note:    Called by RN due to pt hypotension and bradycardia. This Arturo Goodpasture came to bedside. Pt with BP 70s/40s and bradycardia. Ordered NS 500mL bolus.   Discussed with lisa Kim for phenylephrine gtt with caution, instructed RN to titrate s

## 2019-07-11 NOTE — ANESTHESIA POSTPROCEDURE EVALUATION
Caitie Patient Status:  Outpatient in a Bed   Age/Gender 79year old male MRN [id number]   Location [address] [facility] Attending Marcus Go MD, PhD   Elvia Gordon # 0 PCP Giorgio Naidu MD       Anesthesia Post-

## 2019-07-11 NOTE — PLAN OF CARE
Received pt from IR after R carotid stent placed with Dr. Paulino Self. Pt a/ox3-4, drowsy, LINDSEY, R groin C/D/I, soft with no hematomas noted. Rocio very labile upon arrival. No HA or pain noted.  See neuro and post cath flow sheets for pulses, etc. Pt BP and HR c

## 2019-07-11 NOTE — PROGRESS NOTES
History & Physical Examination    Patient Name: Norma Stark  MRN: [id number]  CSN: [id number]  YOB: 1950    Diagnosis: Symptomatic R ICA stenosis    Present Illness: Patient is a 68M with multiple cerebrovascular risk factors including smok joint 4/5/2010   • Attention to surgical dressings and sutures 12/26/2005   • Bleeding from the ear 10/15/07    left, likely secondary to infection   • Breast mass, right 6/21/2006   • Bronchitis 9/25/2006    acute w/ marked bronchospasm   • CAD (coronary History of hypertension 12/4/2006    normotensive without any medications   • History of measles    • Hx of colonic polyp 8/15/2007   • Hydronephrosis 8/6/10    Right, caused by recently passed  2 mm calculus in the urniary bladder.    • Hypercholesterolemi calculus, s/p cystoscopy, Lt ureteroscopy, stone extraction, Lt ureteral stent insertion, 5/26/2008 Lt ureteral calculus with intractable renal colic   • URI (upper respiratory infection) 10/19/09   • Urinary retention 10/7/2003   • Urolithiasis    • Varic comment: quit for 15 years, restarted off and on    Alcohol use: No      SYSTEM Check if Review is Normal Check if Physical Exam is Normal If not normal, please explain:   HEENT [X] [X]    NECK & BACK [X] [X]    HEART [X] [X]    LUNGS [X] [X]    ABDOMEN [X

## 2019-07-12 ENCOUNTER — APPOINTMENT (OUTPATIENT)
Dept: ULTRASOUND IMAGING | Facility: HOSPITAL | Age: 69
DRG: 036 | End: 2019-07-12
Attending: PHYSICIAN ASSISTANT
Payer: MEDICARE

## 2019-07-12 LAB
ANION GAP SERPL CALC-SCNC: 4 MMOL/L (ref 0–18)
BUN BLD-MCNC: 16 MG/DL (ref 7–18)
BUN/CREAT SERPL: 18.6 (ref 10–20)
CALCIUM BLD-MCNC: 8.5 MG/DL (ref 8.5–10.1)
CHLORIDE SERPL-SCNC: 110 MMOL/L (ref 98–112)
CO2 SERPL-SCNC: 28 MMOL/L (ref 21–32)
CREAT BLD-MCNC: 0.86 MG/DL (ref 0.7–1.3)
DEPRECATED RDW RBC AUTO: 51.1 FL (ref 35.1–46.3)
ERYTHROCYTE [DISTWIDTH] IN BLOOD BY AUTOMATED COUNT: 13.9 % (ref 11–15)
GLUCOSE BLD-MCNC: 86 MG/DL (ref 70–99)
HCT VFR BLD AUTO: 36.7 % (ref 39–53)
HGB BLD-MCNC: 12 G/DL (ref 13–17.5)
MCH RBC QN AUTO: 32.6 PG (ref 26–34)
MCHC RBC AUTO-ENTMCNC: 32.7 G/DL (ref 31–37)
MCV RBC AUTO: 99.7 FL (ref 80–100)
OSMOLALITY SERPL CALC.SUM OF ELEC: 294 MOSM/KG (ref 275–295)
PLATELET # BLD AUTO: 228 10(3)UL (ref 150–450)
POTASSIUM SERPL-SCNC: 4.5 MMOL/L (ref 3.5–5.1)
RBC # BLD AUTO: 3.68 X10(6)UL (ref 3.8–5.8)
SODIUM SERPL-SCNC: 142 MMOL/L (ref 136–145)
WBC # BLD AUTO: 9.5 X10(3) UL (ref 4–11)

## 2019-07-12 PROCEDURE — 93880 EXTRACRANIAL BILAT STUDY: CPT | Performed by: PHYSICIAN ASSISTANT

## 2019-07-12 PROCEDURE — 99233 SBSQ HOSP IP/OBS HIGH 50: CPT | Performed by: STUDENT IN AN ORGANIZED HEALTH CARE EDUCATION/TRAINING PROGRAM

## 2019-07-12 PROCEDURE — 99291 CRITICAL CARE FIRST HOUR: CPT | Performed by: OTHER

## 2019-07-12 RX ORDER — HYDROCODONE BITARTRATE AND ACETAMINOPHEN 10; 325 MG/1; MG/1
1-2 TABLET ORAL EVERY 6 HOURS PRN
Status: DISCONTINUED | OUTPATIENT
Start: 2019-07-12 | End: 2019-07-14

## 2019-07-12 RX ORDER — DOPAMINE HYDROCHLORIDE 320 MG/100ML
INJECTION, SOLUTION INTRAVENOUS CONTINUOUS
Status: DISCONTINUED | OUTPATIENT
Start: 2019-07-12 | End: 2019-07-15

## 2019-07-12 RX ORDER — HEPARIN SODIUM 5000 [USP'U]/ML
5000 INJECTION, SOLUTION INTRAVENOUS; SUBCUTANEOUS EVERY 12 HOURS SCHEDULED
Status: DISCONTINUED | OUTPATIENT
Start: 2019-07-12 | End: 2019-07-13

## 2019-07-12 RX ADMIN — PHENYLEPHRINE HCL IN 0.9% NACL 55 MCG/MIN: 50MG/250ML PLASTIC BAG, INJECTION (ML) INTRAVENOUS at 02:30:00

## 2019-07-12 RX ADMIN — ASPIRIN 81 MG: 81 TABLET, CHEWABLE ORAL at 08:50:00

## 2019-07-12 RX ADMIN — PHENYLEPHRINE HCL IN 0.9% NACL 40 MCG/MIN: 50MG/250ML PLASTIC BAG, INJECTION (ML) INTRAVENOUS at 07:05:00

## 2019-07-12 RX ADMIN — PHENYLEPHRINE HCL IN 0.9% NACL 55 MCG/MIN: 50MG/250ML PLASTIC BAG, INJECTION (ML) INTRAVENOUS at 07:00:00

## 2019-07-12 RX ADMIN — DOPAMINE HYDROCHLORIDE 3 MCG/KG/MIN: 320 INJECTION, SOLUTION INTRAVENOUS at 15:21:00

## 2019-07-12 RX ADMIN — HYDROCODONE BITARTRATE AND ACETAMINOPHEN 2 TABLET: 10; 325 TABLET ORAL at 21:09:00

## 2019-07-12 RX ADMIN — SODIUM CHLORIDE: 9 INJECTION, SOLUTION INTRAVENOUS at 15:21:00

## 2019-07-12 RX ADMIN — FAMOTIDINE 20 MG: 20 TABLET ORAL at 08:50:00

## 2019-07-12 RX ADMIN — PHENYLEPHRINE HCL IN 0.9% NACL 75 MCG/MIN: 50MG/250ML PLASTIC BAG, INJECTION (ML) INTRAVENOUS at 01:10:00

## 2019-07-12 RX ADMIN — MORPHINE SULFATE 60 MG: 30 TABLET, FILM COATED, EXTENDED RELEASE ORAL at 14:26:00

## 2019-07-12 RX ADMIN — PHENYLEPHRINE HCL IN 0.9% NACL 50 MCG/MIN: 50MG/250ML PLASTIC BAG, INJECTION (ML) INTRAVENOUS at 08:30:00

## 2019-07-12 RX ADMIN — CLOPIDOGREL BISULFATE 75 MG: 75 TABLET ORAL at 08:50:00

## 2019-07-12 RX ADMIN — PHENYLEPHRINE HCL IN 0.9% NACL 65 MCG/MIN: 50MG/250ML PLASTIC BAG, INJECTION (ML) INTRAVENOUS at 00:55:00

## 2019-07-12 RX ADMIN — PHENYLEPHRINE HCL IN 0.9% NACL 70 MCG/MIN: 50MG/250ML PLASTIC BAG, INJECTION (ML) INTRAVENOUS at 00:45:00

## 2019-07-12 RX ADMIN — HEPARIN SODIUM 5000 UNITS: 5000 INJECTION, SOLUTION INTRAVENOUS; SUBCUTANEOUS at 10:30:00

## 2019-07-12 RX ADMIN — PHENYLEPHRINE HCL IN 0.9% NACL 60 MCG/MIN: 50MG/250ML PLASTIC BAG, INJECTION (ML) INTRAVENOUS at 02:00:00

## 2019-07-12 RX ADMIN — PHENYLEPHRINE HCL IN 0.9% NACL 50 MCG/MIN: 50MG/250ML PLASTIC BAG, INJECTION (ML) INTRAVENOUS at 02:35:00

## 2019-07-12 RX ADMIN — HYDROCODONE BITARTRATE AND ACETAMINOPHEN 2 TABLET: 10; 325 TABLET ORAL at 12:12:00

## 2019-07-12 RX ADMIN — PHENYLEPHRINE HCL IN 0.9% NACL 60 MCG/MIN: 50MG/250ML PLASTIC BAG, INJECTION (ML) INTRAVENOUS at 03:45:00

## 2019-07-12 RX ADMIN — PHENYLEPHRINE HCL IN 0.9% NACL 65 MCG/MIN: 50MG/250ML PLASTIC BAG, INJECTION (ML) INTRAVENOUS at 01:07:00

## 2019-07-12 RX ADMIN — HYDROCODONE BITARTRATE AND ACETAMINOPHEN 2 TABLET: 5; 325 TABLET ORAL at 08:51:00

## 2019-07-12 RX ADMIN — DOPAMINE HYDROCHLORIDE 2 MCG/KG/MIN: 320 INJECTION, SOLUTION INTRAVENOUS at 15:00:00

## 2019-07-12 NOTE — PROGRESS NOTES
Received patient at 1930  A/O x 4  NSR on tele  HR at times down to high 30's  Patient given Atropine 0.5mg x 2 with good response to HR in 50-60's  Patient remains on neosynepherine gtt to maintain SBP   Patient up to chair, with SBP dropping down t

## 2019-07-12 NOTE — PROGRESS NOTES
[facility]  Interventional Neuroradiology Progress Note    Carissa Oliver Patient Status:  Inpatient    1950 MRN [id number]   [facility] 6NE-A Attending Becka Duarte MD, PhD   [facility] Day # 1 PCP Katharina Herrera MD       Subjective: P2Y12: 83, both consistent with efficacy.       Imaging: no new neuro imaging        Assessment:  Symptomatic R ICA stenosis s/p endovascular stent on 7/11 by Dr. Thania Barros  Hypotension and bradycardia, likely due to baroreceptor stimulation s/p stent procedur

## 2019-07-12 NOTE — PROGRESS NOTES
[facility]  Progress Note     Fina Lango Patient Status:  Inpatient    1950 MRN [id number]   [facility] 6NE-A Attending Analisa Santiago MD, PhD   Davin Lezama # 1 PCP Tuan Gee MD     Chief Complaint: Medical Mgt    S: Ella Presume Daily   • aspirin  81 mg Oral Daily   • Clopidogrel Bisulfate  75 mg Oral Daily   • morphINE Sulfate ER  60 mg Oral Q8H [address]       ASSESSMENT / PLAN:     1. Stenosis of R Carotid  2. Hypotension  1. Continue rox  2. BP parameters per Neuro  3. IVF  3.  Chron

## 2019-07-12 NOTE — PROGRESS NOTES
[facility]  Neurocritical Care       Subjective: Cesar Yip is a(n) 79year old male YAS stenosis s/p Angio and stent, hypotension post procedure now controlled with fluids and rox at 50mcg/hr    Review of Systems    Constitutional normal and symmetric. 5/5 b/l 2+ b/l. Sensory: Normal and symmetric to light touch. Cerebellar: Finger-nose-finger intact. Gait: Deferred.   Diagnostics:   Xr Femur Min 2 Views Left (cpt=73552)    Result Date: 6/25/2019  PROCEDURE:  XR FEMUR MIN 2 VIEW valves. Correlate clinically for any prior history of superficial thrombophlebitis. This finding is likely of no current clinical concern. EFFUSION:  None visible. OTHER:  Negative. CONCLUSION:  Negative for fracture.    Dictated by: Kianna Heart Lab 07/11/19  0749 07/12/19  0432   RBC 4.07 3.68*   HGB 13.6 12.0*   HCT 39.4 36.7*   MCV 96.8 99.7   MCH 33.4 32.6   MCHC 34.5 32.7   RDW 13.6 13.9   NEPRELIM 5.22  --    WBC 8.0 9.5   .0 228.0         Recent Labs   Lab 07/11/19  0749 07/12/19 Speech Therapy when appropriate  - IV Fluids @ 100ml/hr, Jason at 50mcg/hr to keep SBP   - Jason if needed. - ASA and Plavix  - Lipitor.   Cardiac:  - Systolic BP Goal   - Last EKG and overnight cardiac monitor   Pulmonary:  - RA or O2 via NC     R

## 2019-07-12 NOTE — PLAN OF CARE
Assumed care of the pt at 0730, neuro intact, checks changed to Q 3 hours, see flow sheet. No changes and no deficits. Remains bradycardic and hypotensive at times, see flow sheet and Emar for titration of IV meds.   Dr. Rylee Galicia called and stated that Dr. Nusrat Xie

## 2019-07-13 PROCEDURE — 99233 SBSQ HOSP IP/OBS HIGH 50: CPT | Performed by: OTHER

## 2019-07-13 PROCEDURE — 99232 SBSQ HOSP IP/OBS MODERATE 35: CPT | Performed by: STUDENT IN AN ORGANIZED HEALTH CARE EDUCATION/TRAINING PROGRAM

## 2019-07-13 PROCEDURE — 99232 SBSQ HOSP IP/OBS MODERATE 35: CPT | Performed by: NURSE PRACTITIONER

## 2019-07-13 RX ADMIN — MORPHINE SULFATE 60 MG: 30 TABLET, FILM COATED, EXTENDED RELEASE ORAL at 09:00:00

## 2019-07-13 RX ADMIN — HYDROCODONE BITARTRATE AND ACETAMINOPHEN 2 TABLET: 10; 325 TABLET ORAL at 21:07:00

## 2019-07-13 RX ADMIN — MORPHINE SULFATE 60 MG: 30 TABLET, FILM COATED, EXTENDED RELEASE ORAL at 17:15:00

## 2019-07-13 RX ADMIN — CLOPIDOGREL BISULFATE 75 MG: 75 TABLET ORAL at 09:44:00

## 2019-07-13 RX ADMIN — DOPAMINE HYDROCHLORIDE 3 MCG/KG/MIN: 320 INJECTION, SOLUTION INTRAVENOUS at 06:00:00

## 2019-07-13 RX ADMIN — FAMOTIDINE 20 MG: 20 TABLET ORAL at 09:44:00

## 2019-07-13 RX ADMIN — SODIUM CHLORIDE: 9 INJECTION, SOLUTION INTRAVENOUS at 13:13:00

## 2019-07-13 RX ADMIN — SODIUM CHLORIDE: 9 INJECTION, SOLUTION INTRAVENOUS at 04:33:00

## 2019-07-13 RX ADMIN — ASPIRIN 81 MG: 81 TABLET, CHEWABLE ORAL at 09:44:00

## 2019-07-13 RX ADMIN — HYDROCODONE BITARTRATE AND ACETAMINOPHEN 2 TABLET: 10; 325 TABLET ORAL at 13:13:00

## 2019-07-13 RX ADMIN — DOPAMINE HYDROCHLORIDE 4 MCG/KG/MIN: 320 INJECTION, SOLUTION INTRAVENOUS at 00:00:00

## 2019-07-13 RX ADMIN — DOPAMINE HYDROCHLORIDE 2 MCG/KG/MIN: 320 INJECTION, SOLUTION INTRAVENOUS at 08:00:00

## 2019-07-13 NOTE — PROGRESS NOTES
[facility]  Neurocritical Care       Subjective: Carolina Farooq is a(n) 79year old male YAS stenosis s/p Angio and stent, hypotension post procedure now controlled with fluids and rox at 50mcg/hr.   7/13/2019 - off of dopamine and SBP Shoulder shrug is normal and symmetrical.   #12:Tongue is midline. Power of tongue normal.        Motor: Tone and strength normal and symmetric. 5/5 b/l 2+ b/l. Sensory: Normal and symmetric to light touch. Cerebellar: Finger-nose-finger intact.   Gait: calf.  Some views suggests that these may represent calcified venous structures as there appear to be small calcified valves. Correlate clinically for any prior history of superficial thrombophlebitis.   This finding is likely of no current clinical concer .0 228.0         Recent Labs   Lab 07/11/19  0749 07/12/19  0432   GLU 82 86   BUN 20* 16   CREATSERUM 0.77 0.86   GFRAA 109 104   GFRNAA 95 90   CA 9.0 8.5    142   K 4.1 4.5    110   CO2 29.0 28.0       Medications:       Current Fac Intake/Output Summary (Last 24 hours) at 7/13/2019 1258  Last data filed at 7/13/2019 1100  Gross per 24 hour   Intake 3144.4 ml   Output 1700 ml   Net 1444.4 ml     - IV Fluids NS   GI:  - GI Prophylaxis   - Bowel Regimen and last Bowel movement     H

## 2019-07-13 NOTE — PROGRESS NOTES
[facility] Interventional Neuro Radiology Progress Note    Carolina Farooq Patient Status:  Inpatient    1950 MRN [id number]   [facility] 6NE-A Attending Ramirez Marino MD, PhD   [facility] Day # 2 PCP Rohith Sotelo MD     CC: Demetra on 7/12, then 3, 6, 12, 24 months  · Follow with Keo Garcia in 2-4 weeks  · Headache   · Tobacco abuse   · Smoking cessation    Case discussed with Eboni Guardado and Keo Garcia. OK to wean off dopamine.       ANDREA Oneal  [address]

## 2019-07-13 NOTE — PLAN OF CARE
Assumed care of this patient at approximately 1930. Monitor shows SB 50's, SBP maintained within parameters . Neurovascular checks within normal, c/o numbness to left lower extremity, that patient states as baseline.  Patient c/o mild headache, back p

## 2019-07-13 NOTE — PLAN OF CARE
Assumed care of the pt at 0730; VSS, afebrile, neuro intact; see flowsheet. Dopamine weaned to off with BP stable. Walked in halls, very stable mobility. Some c/o HA on and off, MD aware. Taking PO without difficulty at this time.  Right groin intact, margarita

## 2019-07-13 NOTE — PROGRESS NOTES
[facility] H[facility]PITAL[facility]  Progress Note     Rich Stanley Patient Status:  Inpatient    1950 MRN [id number]   [facility] 6NE-A Attending Shaina Da Silva MD, PhD   Ali [place] # 2 PCP Nia Guerrero MD     Chief Complaint: Medical Mgt    S: Ramsey Whitfield Daily   • Clopidogrel Bisulfate  75 mg Oral Daily   • morphINE Sulfate ER  60 mg Oral Q8H [address]       ASSESSMENT / PLAN:     1. Stenosis of R Carotid s/p angio/stent   1. DAPT  2. Hypotension  1. Jason---> dopamine   2. BP parameters per Neuro  3.  IVF  3. Chr

## 2019-07-14 LAB
ANION GAP SERPL CALC-SCNC: 3 MMOL/L (ref 0–18)
BUN BLD-MCNC: 18 MG/DL (ref 7–18)
BUN/CREAT SERPL: 23.1 (ref 10–20)
CALCIUM BLD-MCNC: 8.2 MG/DL (ref 8.5–10.1)
CHLORIDE SERPL-SCNC: 113 MMOL/L (ref 98–112)
CO2 SERPL-SCNC: 28 MMOL/L (ref 21–32)
CREAT BLD-MCNC: 0.78 MG/DL (ref 0.7–1.3)
DEPRECATED RDW RBC AUTO: 51.8 FL (ref 35.1–46.3)
ERYTHROCYTE [DISTWIDTH] IN BLOOD BY AUTOMATED COUNT: 13.9 % (ref 11–15)
GLUCOSE BLD-MCNC: 88 MG/DL (ref 70–99)
HCT VFR BLD AUTO: 34.8 % (ref 39–53)
HGB BLD-MCNC: 11.4 G/DL (ref 13–17.5)
MCH RBC QN AUTO: 33 PG (ref 26–34)
MCHC RBC AUTO-ENTMCNC: 32.8 G/DL (ref 31–37)
MCV RBC AUTO: 100.9 FL (ref 80–100)
OSMOLALITY SERPL CALC.SUM OF ELEC: 299 MOSM/KG (ref 275–295)
PLATELET # BLD AUTO: 166 10(3)UL (ref 150–450)
POTASSIUM SERPL-SCNC: 4 MMOL/L (ref 3.5–5.1)
RBC # BLD AUTO: 3.45 X10(6)UL (ref 3.8–5.8)
SODIUM SERPL-SCNC: 144 MMOL/L (ref 136–145)
WBC # BLD AUTO: 7.6 X10(3) UL (ref 4–11)

## 2019-07-14 PROCEDURE — 99232 SBSQ HOSP IP/OBS MODERATE 35: CPT | Performed by: STUDENT IN AN ORGANIZED HEALTH CARE EDUCATION/TRAINING PROGRAM

## 2019-07-14 PROCEDURE — 99232 SBSQ HOSP IP/OBS MODERATE 35: CPT | Performed by: NURSE PRACTITIONER

## 2019-07-14 PROCEDURE — 99233 SBSQ HOSP IP/OBS HIGH 50: CPT | Performed by: OTHER

## 2019-07-14 RX ORDER — MIDODRINE HYDROCHLORIDE 5 MG/1
5 TABLET ORAL 2 TIMES DAILY
Status: DISCONTINUED | OUTPATIENT
Start: 2019-07-14 | End: 2019-07-15

## 2019-07-14 RX ORDER — HYDROCODONE BITARTRATE AND ACETAMINOPHEN 10; 325 MG/1; MG/1
1 TABLET ORAL EVERY 6 HOURS PRN
Status: DISCONTINUED | OUTPATIENT
Start: 2019-07-14 | End: 2019-07-15

## 2019-07-14 RX ORDER — ASPIRIN 81 MG/1
81 TABLET, CHEWABLE ORAL DAILY
Qty: 30 TABLET | Refills: 0 | Status: SHIPPED | OUTPATIENT
Start: 2019-07-15

## 2019-07-14 RX ADMIN — DOPAMINE HYDROCHLORIDE 2 MCG/KG/MIN: 320 INJECTION, SOLUTION INTRAVENOUS at 10:29:00

## 2019-07-14 RX ADMIN — MORPHINE SULFATE 60 MG: 30 TABLET, FILM COATED, EXTENDED RELEASE ORAL at 06:59:00

## 2019-07-14 RX ADMIN — DOPAMINE HYDROCHLORIDE 3 MCG/KG/MIN: 320 INJECTION, SOLUTION INTRAVENOUS at 01:41:00

## 2019-07-14 RX ADMIN — ASPIRIN 81 MG: 81 TABLET, CHEWABLE ORAL at 08:04:00

## 2019-07-14 RX ADMIN — DOPAMINE HYDROCHLORIDE 1 MCG/KG/MIN: 320 INJECTION, SOLUTION INTRAVENOUS at 10:55:00

## 2019-07-14 RX ADMIN — CLOPIDOGREL BISULFATE 75 MG: 75 TABLET ORAL at 08:04:00

## 2019-07-14 RX ADMIN — FAMOTIDINE 20 MG: 20 TABLET ORAL at 08:04:00

## 2019-07-14 RX ADMIN — DOPAMINE HYDROCHLORIDE 1 MCG/KG/MIN: 320 INJECTION, SOLUTION INTRAVENOUS at 06:14:00

## 2019-07-14 RX ADMIN — MORPHINE SULFATE 60 MG: 30 TABLET, FILM COATED, EXTENDED RELEASE ORAL at 16:06:00

## 2019-07-14 RX ADMIN — DOPAMINE HYDROCHLORIDE 2 MCG/KG/MIN: 320 INJECTION, SOLUTION INTRAVENOUS at 01:10:00

## 2019-07-14 RX ADMIN — MIDODRINE HYDROCHLORIDE 5 MG: 5 TABLET ORAL at 21:20:00

## 2019-07-14 RX ADMIN — HYDROCODONE BITARTRATE AND ACETAMINOPHEN 1 TABLET: 10; 325 TABLET ORAL at 21:19:00

## 2019-07-14 RX ADMIN — HYDROCODONE BITARTRATE AND ACETAMINOPHEN 1 TABLET: 10; 325 TABLET ORAL at 08:02:00

## 2019-07-14 RX ADMIN — HYDROCODONE BITARTRATE AND ACETAMINOPHEN 1 TABLET: 10; 325 TABLET ORAL at 15:27:00

## 2019-07-14 RX ADMIN — DOPAMINE HYDROCHLORIDE 2 MCG/KG/MIN: 320 INJECTION, SOLUTION INTRAVENOUS at 04:00:00

## 2019-07-14 RX ADMIN — MIDODRINE HYDROCHLORIDE 5 MG: 5 TABLET ORAL at 15:27:00

## 2019-07-14 RX ADMIN — SODIUM CHLORIDE: 9 INJECTION, SOLUTION INTRAVENOUS at 00:21:00

## 2019-07-14 NOTE — PROGRESS NOTES
Informed by RN that patient BP remains below requested parameters despite attempting IVF boluses, so Dopamine has been restarted. Initial drop reported to have started at 2200, 2-250ml boluses were given and then then Dopamine gtt restarted recently.   Joanne Reyes

## 2019-07-14 NOTE — PROGRESS NOTES
[facility]  Progress Note     Nicolasa Montrose Patient Status:  Inpatient    1950 MRN [id number]   [facility] 6NE-A Attending Conchita Smart MD, PhD   Connor Deluca # 3 PCP Ryan Duron MD     Chief Complaint: Medical Mgt    S: Judyth Shock Epic.    Medications:   • famoTIDine  20 mg Oral Daily   • aspirin  81 mg Oral Daily   • Clopidogrel Bisulfate  75 mg Oral Daily   • morphINE Sulfate ER  60 mg Oral Q8H [address]       ASSESSMENT / PLAN:     1. Stenosis of R Carotid s/p angio/stent   1. DAPT  2.

## 2019-07-14 NOTE — PLAN OF CARE
Assumed care of this patient at approximately 1930. Monitor shows SB 40's-50's, SBP 80's-120's. Neurovascular checks intact. Patient c/o headache, back pain, and left leg pain. See MAR for medications given. Boluses given for SBP less than 95.  Despite shae

## 2019-07-14 NOTE — PLAN OF CARE
Pt received on 2mcg/kg/min dopamine to maintain SBP . Ambulated in lerma, tolerated well, c/o headache. Neuro assessment WNL. R [facility] site WNL. 1135 dopamine weaned off per order.   Pt expressed increase in \"dizziness\" and \"wooziness\" when changing

## 2019-07-14 NOTE — PROGRESS NOTES
Jennifer  Neurocritical Care       Subjective: Teja Galicia is a(n) 79year old male YAS stenosis s/p Angio and stent, hypotension post procedure now controlled with fluids and rox at 50mcg/hr.   7/13/2019 - off of dopamine and SBP # 9 & 10: Soft palate is normal elevation. # 11: Shoulder shrug is normal and symmetrical.   #12:Tongue is midline. Power of tongue normal.        Motor: Tone and strength normal and symmetric. 5/5 b/l 2+ b/l.     Sensory: Normal and symmetric to light to calcifications within the superficial soft tissues of the calf. Some views suggests that these may represent calcified venous structures as there appear to be small calcified valves.   Correlate clinically for any prior history of superficial thrombophlebi 07/14/2019    BUN 18 07/14/2019     07/14/2019    K 4.0 07/14/2019     07/14/2019    CO2 28.0 07/14/2019    GLU 88 07/14/2019    CA 8.2 07/14/2019     Recent Labs   Lab 07/11/19  0749 07/12/19  0432 07/14/19  0420   RBC 4.07 3.68* 3.45*   HGB 1 appropriate  - IV Fluids, keep SBP , will start low dose midodrine  - ASA and Plavix  - Lipitor.   Cardiac:  - Systolic BP Goal   - Last EKG and overnight cardiac monitor   Pulmonary:  - RA or O2 via NC     Renal:    Monitor I/O’s     Intake/Out

## 2019-07-14 NOTE — PROGRESS NOTES
[facility] Interventional Neuro Radiology Progress Note    Mery Crump Patient Status:  Inpatient    1950 MRN [id number]   [facility] 6NE-A Attending Rhea Velazquez MD, PhD   [facility] Day # 3 PCP Nichola Boast, MD     CC: Demetra Assessment/Plan:  · Symptomatic right ICA stenosis s/p endovascular stent on 7/11- PPD #3  ? Hypotension and bradycardia: likely due to baroreceptor dysfunction  ? Dopamine infusing with plan to wean today  ? Maintain SBP   ?  Daily ASA 81mg and P

## 2019-07-15 VITALS
SYSTOLIC BLOOD PRESSURE: 93 MMHG | TEMPERATURE: 98 F | HEART RATE: 52 BPM | HEIGHT: 67 IN | WEIGHT: 181.88 LBS | BODY MASS INDEX: 28.55 KG/M2 | OXYGEN SATURATION: 98 % | RESPIRATION RATE: 20 BRPM | DIASTOLIC BLOOD PRESSURE: 48 MMHG

## 2019-07-15 PROCEDURE — 99232 SBSQ HOSP IP/OBS MODERATE 35: CPT | Performed by: PHYSICIAN ASSISTANT

## 2019-07-15 PROCEDURE — 99232 SBSQ HOSP IP/OBS MODERATE 35: CPT | Performed by: STUDENT IN AN ORGANIZED HEALTH CARE EDUCATION/TRAINING PROGRAM

## 2019-07-15 RX ORDER — MIDODRINE HYDROCHLORIDE 5 MG/1
5 TABLET ORAL ONCE
Status: COMPLETED | OUTPATIENT
Start: 2019-07-15 | End: 2019-07-15

## 2019-07-15 RX ORDER — MIDODRINE HYDROCHLORIDE 5 MG/1
5 TABLET ORAL 3 TIMES DAILY
Status: DISCONTINUED | OUTPATIENT
Start: 2019-07-15 | End: 2019-07-15

## 2019-07-15 RX ORDER — MIDODRINE HYDROCHLORIDE 5 MG/1
5 TABLET ORAL 3 TIMES DAILY
Qty: 21 TABLET | Refills: 0 | Status: SHIPPED | OUTPATIENT
Start: 2019-07-15 | End: 2019-07-24

## 2019-07-15 RX ADMIN — MORPHINE SULFATE 60 MG: 30 TABLET, FILM COATED, EXTENDED RELEASE ORAL at 06:26:00

## 2019-07-15 RX ADMIN — ASPIRIN 81 MG: 81 TABLET, CHEWABLE ORAL at 06:26:00

## 2019-07-15 RX ADMIN — MIDODRINE HYDROCHLORIDE 5 MG: 5 TABLET ORAL at 14:02:00

## 2019-07-15 RX ADMIN — MORPHINE SULFATE 60 MG: 30 TABLET, FILM COATED, EXTENDED RELEASE ORAL at 14:02:00

## 2019-07-15 RX ADMIN — CLOPIDOGREL BISULFATE 75 MG: 75 TABLET ORAL at 06:26:00

## 2019-07-15 RX ADMIN — MIDODRINE HYDROCHLORIDE 5 MG: 5 TABLET ORAL at 17:49:00

## 2019-07-15 RX ADMIN — MIDODRINE HYDROCHLORIDE 5 MG: 5 TABLET ORAL at 06:27:00

## 2019-07-15 NOTE — PLAN OF CARE
NURSING DISCHARGE NOTE    Discharged Home via Wheelchair. Accompanied by Family member and Support staff  Belongings Taken by patient/family. Cleared for discharge by all consults. Discharge AVS completed and reviewed with patient and wife.  Reviewe artery perfusion - ex.  Angina  - Evaluate fluid balance, assess for edema, trend weights  7/15/2019 1853 by Magy Wiley RN  Outcome: Adequate for Discharge  7/15/2019 1850 by Magy Wiley RN  Outcome: Progressing  Goal: Absence of cardiac arrhythmia

## 2019-07-15 NOTE — PROGRESS NOTES
[facility]  Interventional Neuroradiology Progress Note    Arnav Boss Patient Status:  Inpatient    1950 MRN [id number]   [facility] 7NE-A Attending Anita Ugarte MD, PhD   [facility] Day # 4 PCP Favio Kirkpatrick MD       Subjective: then 3, 6, 12, 24 months  Morphine sulfate, Norco (home meds) per primary service, have decreased Norco due to hypotension  Midodrine 5mg BID, per primary/Neurology  Smoking cessation   Follow up in [address] [place] [address] on 8/1  Follow up with PCP to manage midodrine

## 2019-07-15 NOTE — PROGRESS NOTES
[facility]  Progress Note     Maya Ross Patient Status:  Inpatient    1950 MRN [id number]   [facility] 6NE-A Attending Minh Robertson MD, PhD   Enriqueta Sharpe # 4 PCP Bernice Sena MD     Chief Complaint: Medical Mgt    S: Luis Garsia reviewed in Epic. Medications:   • Midodrine HCl  5 mg Oral BID   • famoTIDine  20 mg Oral Daily   • aspirin  81 mg Oral Daily   • Clopidogrel Bisulfate  75 mg Oral Daily   • morphINE Sulfate ER  60 mg Oral Q8H Carmenza Mao       ASSESSMENT / PLAN:     Nora Cuellar

## 2019-07-15 NOTE — PLAN OF CARE
Assumed care or patient at 1900. Alert and oriented x4. Complaints of chronic pain to left leg and low back. With HS VS checks /51, 1 Norco given per request and HS midodrine dose given as well. Patient refused evening dose of MS contin.  0100 Vital s

## 2019-07-22 ENCOUNTER — TELEPHONE (OUTPATIENT)
Dept: SURGERY | Facility: CLINIC | Age: 69
End: 2019-07-22

## 2019-07-22 NOTE — TELEPHONE ENCOUNTER
Spoke with patient who stated his B/P is now elevated at 141/97. Normally B/P is 115/65. He also is having a throbbing headache. Patient started Midodrine while in the hospital as his B/P was too low. He was instructed to take the Midodrine TID.  So far

## 2019-07-22 NOTE — TELEPHONE ENCOUNTER
Spoke with ANDREA Mott who stated patient's body is getting adjusted to the stent that was placed. Patient should hold off on taking the Midodrine. Monitor his B/P at least twice/day. Patient to f/u with PCP with 1 week.  Patient may also take tyleno

## 2019-07-23 NOTE — DISCHARGE SUMMARY
[facility]  Discharge Summary    Branden Zapien Patient Status:  Inpatient    1950 MRN [id number]   [facility] No att[facility] providers found   [facility] Day # 4 PCP Val Mead MD     Date of Admission:  episodes despite maximum medical therapy. He presented to [facility] for scheduled endovascular YAS stent procedure. He was admitted to CNICU post-procedure. He was noted to have hypotension and bradycardia, likely due to baroreceptor dysfunction.   He was maintained/controlled  Wound Care: keep wound clean and dry, ok to [address], PA-C  [address] [place] [address]  7/23/2019, 2:40 PM

## 2019-07-24 ENCOUNTER — OFFICE VISIT (OUTPATIENT)
Dept: SURGERY | Facility: CLINIC | Age: 69
End: 2019-07-24
Payer: MEDICARE

## 2019-07-24 VITALS — SYSTOLIC BLOOD PRESSURE: 118 MMHG | HEART RATE: 70 BPM | DIASTOLIC BLOOD PRESSURE: 74 MMHG

## 2019-07-24 DIAGNOSIS — I65.21 SYMPTOMATIC STENOSIS OF RIGHT CAROTID ARTERY WITHOUT INFARCTION: Primary | ICD-10-CM

## 2019-07-24 PROCEDURE — 99214 OFFICE O/P EST MOD 30 MIN: CPT

## 2019-07-24 NOTE — PROGRESS NOTES
Pt is here for follow up post IR Stent. 7/11/19. Pt states that he has been doing well since stent.      Review of Systems:    Hand Dominance: right  General: no symptoms reported  Neuro: no symptoms reported  Head: no symptoms reported  Musculoskeletal: no

## 2019-07-24 NOTE — PROGRESS NOTES
[facility]  Interventional Neuroradiology Clinic Note        Angel Chaudhry MD  Ophthalmology  [facility] - RAINA Saldaña MD  Neurology  [address] MD  Primary Care        Date of Service: 7/24/2 dysarthria, or speech expression/comprehension.      Medications:  •  aspirin 81 MG Oral Chew Tab, Chew 1 tablet (81 mg total) by mouth daily. , Disp: 30 tablet, Rfl: 0  •  morphINE Sulfate ER 60 MG Oral Tab CR, Take 60 mg by mouth 3 (three) times daily. , D 4+/5 strength. Chronic sensory paresthesias are present in the left hip/thigh and lower leg related to chronic traumatic leg injury.  There is no pronator drift on Rockwell testing.  Romberg testing is negative.  There is no evidence of nystagmus, ataxia, dysa until stent endothelialization to prevent thromboembolic complicatinos with conversion to single ASA 81mg daily therapy indefinitely afterwards.      We counseled the patient on cerebrovascular risk factor reduction for ischemic/hemorrhagic stroke preventi

## 2019-09-29 RX ORDER — ATORVASTATIN CALCIUM 20 MG/1
20 TABLET, FILM COATED ORAL NIGHTLY
Qty: 30 TABLET | Refills: 3 | Status: CANCELLED | OUTPATIENT
Start: 2019-09-29

## 2019-09-30 RX ORDER — ATORVASTATIN CALCIUM 20 MG/1
20 TABLET, FILM COATED ORAL NIGHTLY
Qty: 30 TABLET | Refills: 3 | Status: SHIPPED | OUTPATIENT
Start: 2019-09-30 | End: 2020-12-10 | Stop reason: ALTCHOICE

## 2019-09-30 RX ORDER — CLOPIDOGREL BISULFATE 75 MG/1
75 TABLET ORAL DAILY
Qty: 30 TABLET | Refills: 3 | Status: SHIPPED | OUTPATIENT
Start: 2019-09-30 | End: 2020-12-10 | Stop reason: ALTCHOICE

## 2020-05-19 ENCOUNTER — HOSPITAL ENCOUNTER (OUTPATIENT)
Dept: GENERAL RADIOLOGY | Facility: HOSPITAL | Age: 70
Discharge: HOME | End: 2020-05-19
Attending: NURSE PRACTITIONER
Payer: MEDICARE

## 2020-05-19 ENCOUNTER — HOSPITAL ENCOUNTER (OUTPATIENT)
Dept: ULTRASOUND IMAGING | Facility: HOSPITAL | Age: 70
Discharge: HOME | End: 2020-05-19
Attending: NURSE PRACTITIONER
Payer: MEDICARE

## 2020-05-19 DIAGNOSIS — M79.89 LEFT LEG SWELLING: ICD-10-CM

## 2020-05-19 DIAGNOSIS — R52 TENDERNESS: ICD-10-CM

## 2020-05-19 DIAGNOSIS — M79.605 PAIN IN LEFT LEG: ICD-10-CM

## 2020-05-19 PROCEDURE — 73590 X-RAY EXAM OF LOWER LEG: CPT | Performed by: NURSE PRACTITIONER

## 2020-05-19 PROCEDURE — 93971 EXTREMITY STUDY: CPT | Performed by: NURSE PRACTITIONER

## 2020-05-19 PROCEDURE — 93926 LOWER EXTREMITY STUDY: CPT | Performed by: NURSE PRACTITIONER

## 2020-12-04 ENCOUNTER — PATIENT MESSAGE (OUTPATIENT)
Dept: SURGERY | Facility: CLINIC | Age: 70
End: 2020-12-04

## 2020-12-04 DIAGNOSIS — I65.21 SYMPTOMATIC STENOSIS OF RIGHT CAROTID ARTERY WITHOUT INFARCTION: Primary | ICD-10-CM

## 2020-12-07 NOTE — TELEPHONE ENCOUNTER
Will discuss with Jyoti if pt can hold off on imaging. Per protocol, she is over due. 11/20/2019 she was routed to Central scheduling for her carotid ultrasound and was instructed to call us back once that was scheduled.     She was reminded she needed a

## 2020-12-07 NOTE — TELEPHONE ENCOUNTER
From: Daniel Rossi  To: Saloni Musa MD, PhD  Sent: 12/4/2020 3:53 PM CST  Subject: Visit Follow-up Question    Hi Dr Melecio Pang,    Not sure if you remember me but it appears you've done a great job on the stent that you put in my carotid artery over a ye

## 2020-12-30 ENCOUNTER — HOSPITAL ENCOUNTER (OUTPATIENT)
Dept: ULTRASOUND IMAGING | Facility: HOSPITAL | Age: 70
Discharge: HOME | End: 2020-12-30
Attending: NURSE PRACTITIONER
Payer: MEDICARE

## 2020-12-30 DIAGNOSIS — I65.21 SYMPTOMATIC STENOSIS OF RIGHT CAROTID ARTERY WITHOUT INFARCTION: ICD-10-CM

## 2020-12-30 PROCEDURE — 93880 EXTRACRANIAL BILAT STUDY: CPT | Performed by: NURSE PRACTITIONER

## 2021-01-19 ENCOUNTER — TELEPHONE (OUTPATIENT)
Dept: SURGERY | Facility: CLINIC | Age: 71
End: 2021-01-19

## 2021-01-19 ENCOUNTER — TELEMEDICINE (OUTPATIENT)
Dept: SURGERY | Facility: CLINIC | Age: 71
End: 2021-01-19

## 2021-01-19 DIAGNOSIS — I65.21 SYMPTOMATIC STENOSIS OF RIGHT CAROTID ARTERY WITHOUT INFARCTION: Primary | ICD-10-CM

## 2021-01-19 PROCEDURE — 99441 PHONE E/M BY PHYS 5-10 MIN: CPT | Performed by: NURSE PRACTITIONER

## 2021-01-19 NOTE — TELEPHONE ENCOUNTER
Please place reminder for follow up Carotid US for symptomatic R ICA stenosis s/p stent placement in December 2021

## 2021-01-19 NOTE — PROGRESS NOTES
Virtual / Telephone Check - In  Anthony Ritter verbally consents to a virtual / telephone check-in service on (date). Patient understands and accepts financial responsibility for any deductible, co-insurance and / or co-pays associated with this service. patient denies associated headaches, neck pain, nausea, or vomiting.   The patient denies cranial nerve symptoms such as blurred vision, diplopia, photophobia, ptosis, facial weakness/paresthesias, hearing loss, vertigo, tinnitus, hoarseness, dysphagia, or [address]  1/19/2021, 11:10 AM

## 2021-02-01 DIAGNOSIS — Z23 NEED FOR VACCINATION: ICD-10-CM

## 2021-02-02 ENCOUNTER — IMMUNIZATION (OUTPATIENT)
Dept: LAB | Age: 71
End: 2021-02-02
Attending: HOSPITALIST
Payer: MEDICARE

## 2021-02-02 DIAGNOSIS — Z23 NEED FOR VACCINATION: Primary | ICD-10-CM

## 2021-02-02 PROCEDURE — 0001A SARSCOV2 VAC 30MCG/0.3ML IM: CPT

## 2021-02-08 ENCOUNTER — LAB ENCOUNTER (OUTPATIENT)
Dept: LAB | Facility: HOSPITAL | Age: 71
End: 2021-02-08
Attending: NURSE PRACTITIONER
Payer: MEDICARE

## 2021-02-08 DIAGNOSIS — Z20.818 CONTACT WITH AND (SUSPECTED) EXPOSURE TO OTHER BACTERIAL COMMUNICABLE DISEASES: ICD-10-CM

## 2021-02-09 LAB — SARS-COV-2 RNA RESP QL NAA+PROBE: NOT DETECTED

## 2021-02-11 NOTE — PROGRESS NOTES
Patient was notified via [employer] by Delma Askew. I did confirm it was viewed by patient as well. Patient informed via [address]

## 2021-02-23 ENCOUNTER — IMMUNIZATION (OUTPATIENT)
Dept: LAB | Age: 71
End: 2021-02-23
Attending: HOSPITALIST
Payer: MEDICARE

## 2021-02-23 DIAGNOSIS — Z23 NEED FOR VACCINATION: Primary | ICD-10-CM

## 2021-02-23 PROCEDURE — 0002A SARSCOV2 VAC 30MCG/0.3ML IM: CPT

## 2021-07-28 NOTE — H&P
History & Physical Examination    Patient Name: Donis Arguello  MRN: [id number]  [facility]: [id number]  YOB: 1950    Diagnosis: Symptomatic R ICA stenosis    Present Illness: Patient is a 68M with multiple cerebrovascular risk factors including smok Pt notified. 4/5/2010   • Attention to surgical dressings and sutures 12/26/2005   • Bleeding from the ear 10/15/07    left, likely secondary to infection   • Breast mass, right 6/21/2006   • Bronchitis 9/25/2006    acute w/ marked bronchospasm   • CAD (coronary artery of hypertension 12/4/2006    normotensive without any medications   • History of measles    • Hx of colonic polyp 8/15/2007   • Hydronephrosis 8/6/10    Right, caused by recently passed  2 mm calculus in the urniary bladder.    • Hypercholesterolemia    • H cystoscopy, Lt ureteroscopy, stone extraction, Lt ureteral stent insertion, 5/26/2008 Lt ureteral calculus with intractable renal colic   • URI (upper respiratory infection) 10/19/09   • Urinary retention 10/7/2003   • Urolithiasis    • Varicose veins 11/3 years, restarted off and on    Alcohol use: No      SYSTEM Check if Review is Normal Check if Physical Exam is Normal If not normal, please explain:   HEENT [X] [X]    NECK & BACK [X] [X]    HEART [X] [X]    LUNGS [X] [X]    ABDOMEN [X] [X]    UROGENITAL [

## 2021-10-20 ENCOUNTER — APPOINTMENT (OUTPATIENT)
Dept: CT IMAGING | Facility: HOSPITAL | Age: 71
DRG: 392 | End: 2021-10-20
Attending: EMERGENCY MEDICINE
Payer: MEDICARE

## 2021-10-20 ENCOUNTER — HOSPITAL ENCOUNTER (INPATIENT)
Facility: HOSPITAL | Age: 71
LOS: 2 days | Discharge: HOME | DRG: 392 | End: 2021-10-22
Attending: EMERGENCY MEDICINE | Admitting: INTERNAL MEDICINE
Payer: MEDICARE

## 2021-10-20 DIAGNOSIS — R10.31 RLQ ABDOMINAL PAIN: Primary | ICD-10-CM

## 2021-10-20 PROCEDURE — 74177 CT ABD & PELVIS W/CONTRAST: CPT | Performed by: EMERGENCY MEDICINE

## 2021-10-20 PROCEDURE — 83690 ASSAY OF LIPASE: CPT | Performed by: EMERGENCY MEDICINE

## 2021-10-20 PROCEDURE — S0030 INJECTION, METRONIDAZOLE: HCPCS | Performed by: EMERGENCY MEDICINE

## 2021-10-20 PROCEDURE — 85025 COMPLETE CBC W/AUTO DIFF WBC: CPT | Performed by: EMERGENCY MEDICINE

## 2021-10-20 PROCEDURE — 96375 TX/PRO/DX INJ NEW DRUG ADDON: CPT

## 2021-10-20 PROCEDURE — 96365 THER/PROPH/DIAG IV INF INIT: CPT

## 2021-10-20 PROCEDURE — 96361 HYDRATE IV INFUSION ADD-ON: CPT

## 2021-10-20 PROCEDURE — S0030 INJECTION, METRONIDAZOLE: HCPCS | Performed by: NURSE PRACTITIONER

## 2021-10-20 PROCEDURE — 80053 COMPREHEN METABOLIC PANEL: CPT | Performed by: EMERGENCY MEDICINE

## 2021-10-20 PROCEDURE — 99285 EMERGENCY DEPT VISIT HI MDM: CPT

## 2021-10-20 RX ORDER — SODIUM CHLORIDE 9 MG/ML
INJECTION, SOLUTION INTRAVENOUS CONTINUOUS
Status: DISCONTINUED | OUTPATIENT
Start: 2021-10-20 | End: 2021-10-21

## 2021-10-20 RX ORDER — CIPROFLOXACIN 2 MG/ML
400 INJECTION, SOLUTION INTRAVENOUS EVERY 12 HOURS
Status: DISCONTINUED | OUTPATIENT
Start: 2021-10-20 | End: 2021-10-22

## 2021-10-20 RX ORDER — PROCHLORPERAZINE EDISYLATE 5 MG/ML
10 INJECTION INTRAMUSCULAR; INTRAVENOUS EVERY 6 HOURS PRN
Status: DISCONTINUED | OUTPATIENT
Start: 2021-10-20 | End: 2021-10-22

## 2021-10-20 RX ORDER — METRONIDAZOLE 500 MG/100ML
500 INJECTION, SOLUTION INTRAVENOUS ONCE
Status: COMPLETED | OUTPATIENT
Start: 2021-10-20 | End: 2021-10-20

## 2021-10-20 RX ORDER — METRONIDAZOLE 500 MG/100ML
500 INJECTION, SOLUTION INTRAVENOUS EVERY 8 HOURS
Status: DISCONTINUED | OUTPATIENT
Start: 2021-10-20 | End: 2021-10-22

## 2021-10-20 RX ORDER — HYDROMORPHONE HYDROCHLORIDE 1 MG/ML
1 INJECTION, SOLUTION INTRAMUSCULAR; INTRAVENOUS; SUBCUTANEOUS ONCE
Status: COMPLETED | OUTPATIENT
Start: 2021-10-20 | End: 2021-10-20

## 2021-10-20 RX ORDER — HYDROMORPHONE HYDROCHLORIDE 1 MG/ML
0.2 INJECTION, SOLUTION INTRAMUSCULAR; INTRAVENOUS; SUBCUTANEOUS EVERY 2 HOUR PRN
Status: DISCONTINUED | OUTPATIENT
Start: 2021-10-20 | End: 2021-10-22

## 2021-10-20 RX ORDER — ENOXAPARIN SODIUM 100 MG/ML
40 INJECTION SUBCUTANEOUS DAILY
Status: DISCONTINUED | OUTPATIENT
Start: 2021-10-20 | End: 2021-10-22

## 2021-10-20 RX ORDER — MORPHINE SULFATE 4 MG/ML
4 INJECTION, SOLUTION INTRAMUSCULAR; INTRAVENOUS ONCE
Status: COMPLETED | OUTPATIENT
Start: 2021-10-20 | End: 2021-10-20

## 2021-10-20 RX ORDER — HYDROMORPHONE HYDROCHLORIDE 1 MG/ML
0.8 INJECTION, SOLUTION INTRAMUSCULAR; INTRAVENOUS; SUBCUTANEOUS EVERY 2 HOUR PRN
Status: DISCONTINUED | OUTPATIENT
Start: 2021-10-20 | End: 2021-10-22

## 2021-10-20 RX ORDER — HYDROCODONE BITARTRATE AND ACETAMINOPHEN 10; 325 MG/1; MG/1
1 TABLET ORAL EVERY 6 HOURS PRN
Status: ON HOLD | COMMUNITY
End: 2021-10-22

## 2021-10-20 RX ORDER — ONDANSETRON 2 MG/ML
4 INJECTION INTRAMUSCULAR; INTRAVENOUS EVERY 6 HOURS PRN
Status: DISCONTINUED | OUTPATIENT
Start: 2021-10-20 | End: 2021-10-22

## 2021-10-20 RX ORDER — HYDROMORPHONE HYDROCHLORIDE 1 MG/ML
0.4 INJECTION, SOLUTION INTRAMUSCULAR; INTRAVENOUS; SUBCUTANEOUS EVERY 2 HOUR PRN
Status: DISCONTINUED | OUTPATIENT
Start: 2021-10-20 | End: 2021-10-22

## 2021-10-20 NOTE — ED QUICK NOTES
Received pt awake and alert with complaint of rlq abd pain states morphine did not help his pain pt requests dilaudid md notified

## 2021-10-20 NOTE — ED PROVIDER NOTES
Patient Seen in: [facility] Emergency Department      History   Patient presents with:  Abdomen/Flank Pain    Stated Complaint: lower right abd pain     Subjective:   HPI    Patient is a 62-year-old gentleman here for evaluation of right lower quadr 11/30/2007    vein disease producing physical evidence of Stage III chronic venous insufficiency with corona phlebectatica, hyperpigmentation, ankle swelling, dermatitis, lipodermatosclerosis, and healed ulceration   • Cough 6/18/2011    hyperinflated lung [facility])    • Perforation of left tympanic membrane 10/15/2007   • Peripheral neuropathy     hx    • Pneumonia    • Psoriasis     per pt.    • PVD (peripheral vascular disease) ([facility] [address]     left leg   • Reflex sympathetic dystrophy of the lower limb 4/15/2008   • Physical Exam      Constitutional: Awake, alert, age appearing, non-toxic  Head: Normocephalic and atraumatic. Eyes: EOM are normal. Pupils are equal, round, and reactive to light. Neck: Normal range of motion. Neck supple. No JVD present. shows right-sided diverticulitis, radiology cannot rule out underlying mass.     CT ABDOMEN PELVIS IV CONTRAST, NO ORAL (ER)    Result Date: 10/20/2021  PROCEDURE:  CT ABDOMEN PELVIS IV CONTRAST, NO ORAL (ER)  COMPARISON:  [facility] , CT, CT ABD/PLV(S) KIDNEYS thickening with edematous changes arising from the cecum/lateral wall of the proximal ascending colon over a length of approximately 3.7 cm. Differential includes right-sided diverticulitis versus underlying colonic mass not to be excluded.   The appendix

## 2021-10-20 NOTE — ED INITIAL ASSESSMENT (HPI)
71YM c/c of abd pain Pt state that he started having RLQ pain that started yesterday Pt state that he been nausea denies any vomiting Pt state that his pain is worse tonight

## 2021-10-20 NOTE — PROGRESS NOTES
NURSING ADMISSION NOTE      Patient admitted via Cart  Oriented to room. Safety precautions initiated. Bed in low position. Call light in reach. Patient admitted to [address] [facility]. Admit navigator completed. Called MD or orders. GI consulted in ED.  Patient

## 2021-10-20 NOTE — CONSULTS
[facility]                       Gastroenterology [address] [facility] Gastroenterology    [facility] Patient Status:  Inpatient    1950 MRN [id number]   [facility] 3NE-A Attending Wilma Monzon MD   Hosp Day # 0 PCP Phoebe Rainey left, likely secondary to infection   • Breast mass, right 6/21/2006   • Bronchitis 9/25/2006    acute w/ marked bronchospasm   • CAD (coronary artery disease) 12/4/2006    3/19/2002 mild, s/p left heard catheterization, coronary angiography, left ventricu colonic polyp 8/15/2007   • Hydronephrosis 8/6/10    Right, caused by recently passed  2 mm calculus in the urniary bladder.    • Hypercholesterolemia    • Hyperlipidemia    • Hypothyroidism    • Infection     history of pseudomonal gangrenous infection, Lt calculus with intractable renal colic   • URI (upper respiratory infection) 10/19/09   • Urinary retention 10/7/2003   • Urolithiasis    • Varicose veins 11/30/2007    Type III varicose vein disease   • Vasculopathy 8/23/2006    nonhealing wound, lower Lt injection 0.2 mg, 0.2 mg, Intravenous, Q2H PRN   Or  HYDROmorphone HCl (DILAUDID) 1 MG/ML injection 0.4 mg, 0.4 mg, Intravenous, Q2H PRN   Or  HYDROmorphone HCl (DILAUDID) 1 MG/ML injection 0.8 mg, 0.8 mg, Intravenous, Q2H PRN      Allergies:   Adhesive Ta oropharynx is clear with moist mucosal membranes  Eyes: Sclerae are anicteric  Neck:  Supple without nuchal rigidity  CV: Regular rate and rhythm, with normal S1 and S2  Resp: Diminished bilaterally without wheezes; rubs, rhonchi, or rales  Abdomen: Soft, includes the Dose Index Registry.       PATIENT STATED HISTORY:(As transcribed by Technologist)  Right lower quadrant pain and nausea that started yesterday.        CONTRAST USED:  80cc of Omnipaque 350       FINDINGS:     LUNG BASE:  Atelectasis.    LIVER: hx of RLQ abdominal pain which progressed to nausea. CT a/p IV suggests inflammatory stranding adjacent to an area of diverticulum in the cecum/prox ascending colon--no free air; labs with leukocytosis (WBC 14).  Will treat for acute diverticulitis (1st epi weeks  2. GI clinic follow-up in 4 weeks  3. MiraLAX once daily  4. IVF, analgesia, antiemetics per primary team  5.  Agree with antibiotics    GI will sign off, please page with questions or concerns    Jose Sparks MD  10/20/2021  Stormy Torres

## 2021-10-20 NOTE — H&P
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    • PVD (peripher HISTORY  6/20/2008    cystoscopy, stent removal - indwelling ureteral stent   • OTHER SURGICAL HISTORY      neck surgery   • OTHER SURGICAL HISTORY  3/19/02    left heard catheterization, coronary angiography, left ventriculography   • OTHER SURGICAL HISTO murmurs. Equal pulses   Abdomen: Soft, RLQ tender, nondistended. Positive bowel sounds. No rebound tenderness  Neurologic: No focal neurological deficits. Musculoskeletal: Full range of motion of all extremities. No swelling noted.   Integument: No lesio BOWEL/MESENTERY:  Normal caliber appendix. Extensive stool throughout the colon. There is some localized asymmetric mural wall thickening along the lateral aspect of the base of the cecum/proximal ascending colon in an area of diverticulum.   This may rep Dyslipidemia     Spinal stenosis     Chronic back pain     Hypercholesterolemia     Chronic leg pain     CRPS (complex regional pain syndrome type II)     Vitamin D deficiency     Ureterolithiasis     History of hypertension     Varicose veins     Chronic

## 2021-10-21 PROCEDURE — 80053 COMPREHEN METABOLIC PANEL: CPT | Performed by: INTERNAL MEDICINE

## 2021-10-21 PROCEDURE — 85025 COMPLETE CBC W/AUTO DIFF WBC: CPT | Performed by: INTERNAL MEDICINE

## 2021-10-21 PROCEDURE — S0030 INJECTION, METRONIDAZOLE: HCPCS | Performed by: NURSE PRACTITIONER

## 2021-10-21 NOTE — PLAN OF CARE
Pt is aox4. States pain is 8/10- medicated with IV dilaudid. Pt states pain is less when he lies quietly in bed. Pt walked hallway with wife. IV fluids. Medicated per orders. IV abx given. UP with assist to bathroom.      Problem: GASTROINTESTINAL - ADUL non-pharmacological measures as appropriate and evaluate response  - Consider cultural and social influences on pain and pain management  - Manage/alleviate anxiety  - Utilize distraction and/or relaxation techniques  - Monitor for opioid side effects  - N

## 2021-10-21 NOTE — PROGRESS NOTES
[facility]  Progress Note    Donis Arguello Patient Status:  Inpatient    1950 MRN [id number]   [facility] 3NE-A Attending Edith Arevalo MD   Hosp Day # 1 PCP Abhishek Nash MD       SUBJECTIVE:  Pain better   No BM this AM   OBJE components found for: BMP   Recent Labs   Lab 10/20/21  0510 10/21/21  0619   RBC 4.68 4.02   HGB 15.9 13.4   HCT 45.3 40.9   MCV 96.8 101.7*   MCH 34.0 33.3   MCHC 35.1 32.8   RDW 12.7 13.0   NEPRELIM 11.13* 6.68   WBC 14.0* 9.7   .0 161.0     No r Amandeep Pike MD  10/21/2021  2:57 PM

## 2021-10-22 VITALS
DIASTOLIC BLOOD PRESSURE: 52 MMHG | HEART RATE: 68 BPM | OXYGEN SATURATION: 96 % | RESPIRATION RATE: 18 BRPM | BODY MASS INDEX: 25 KG/M2 | SYSTOLIC BLOOD PRESSURE: 129 MMHG | TEMPERATURE: 98 F | WEIGHT: 160 LBS

## 2021-10-22 PROBLEM — H16.041 MARGINAL CORNEAL ULCER OF RIGHT EYE: Status: ACTIVE | Noted: 2021-08-03

## 2021-10-22 PROBLEM — K57.33 DIVERTICULITIS LARGE INTESTINE W/O PERFORATION OR ABSCESS W/BLEEDING: Status: ACTIVE | Noted: 2021-10-22

## 2021-10-22 PROCEDURE — S0030 INJECTION, METRONIDAZOLE: HCPCS | Performed by: NURSE PRACTITIONER

## 2021-10-22 PROCEDURE — 80048 BASIC METABOLIC PNL TOTAL CA: CPT | Performed by: INTERNAL MEDICINE

## 2021-10-22 PROCEDURE — 85025 COMPLETE CBC W/AUTO DIFF WBC: CPT | Performed by: INTERNAL MEDICINE

## 2021-10-22 RX ORDER — HYDROCODONE BITARTRATE AND ACETAMINOPHEN 10; 325 MG/1; MG/1
1 TABLET ORAL EVERY 6 HOURS PRN
Qty: 120 TABLET | Refills: 0 | Status: SHIPPED | OUTPATIENT
Start: 2021-10-22 | End: 2021-11-24

## 2021-10-22 RX ORDER — CIPROFLOXACIN 500 MG/1
500 TABLET, FILM COATED ORAL EVERY 12 HOURS SCHEDULED
Qty: 16 TABLET | Refills: 0 | Status: SHIPPED | OUTPATIENT
Start: 2021-10-22 | End: 2021-10-30

## 2021-10-22 RX ORDER — PSEUDOEPHEDRINE HCL 30 MG
100 TABLET ORAL 2 TIMES DAILY PRN
Qty: 30 CAPSULE | Refills: 0 | Status: SHIPPED | OUTPATIENT
Start: 2021-10-22

## 2021-10-22 RX ORDER — HYDROCODONE BITARTRATE AND ACETAMINOPHEN 10; 325 MG/1; MG/1
1 TABLET ORAL EVERY 6 HOURS PRN
Status: DISCONTINUED | OUTPATIENT
Start: 2021-10-22 | End: 2021-10-22

## 2021-10-22 RX ORDER — DOCUSATE SODIUM 100 MG/1
100 CAPSULE, LIQUID FILLED ORAL 2 TIMES DAILY
Status: DISCONTINUED | OUTPATIENT
Start: 2021-10-22 | End: 2021-10-22

## 2021-10-22 RX ORDER — METRONIDAZOLE 500 MG/1
500 TABLET ORAL EVERY 8 HOURS SCHEDULED
Status: DISCONTINUED | OUTPATIENT
Start: 2021-10-22 | End: 2021-10-22

## 2021-10-22 RX ORDER — METRONIDAZOLE 500 MG/1
500 TABLET ORAL EVERY 8 HOURS SCHEDULED
Qty: 24 TABLET | Refills: 0 | Status: SHIPPED | OUTPATIENT
Start: 2021-10-22 | End: 2021-10-30

## 2021-10-22 RX ORDER — CIPROFLOXACIN 500 MG/1
500 TABLET, FILM COATED ORAL EVERY 12 HOURS SCHEDULED
Status: DISCONTINUED | OUTPATIENT
Start: 2021-10-22 | End: 2021-10-22

## 2021-10-22 NOTE — PROGRESS NOTES
Pt A&Ox4 Room Air  Resting in bed  Family at the bedside  C/O RLQ pain, medicated per Mar  Voids  IV Abx  Full liquid diet, AM will start Soft diet  Safety precaution maintained  Will continue to monitor

## 2021-10-22 NOTE — PLAN OF CARE
Dc instructions and meds reviewed with pt. Verbalized understanding. Iv discontinued to right le, bleeding controlled.

## 2021-11-02 ENCOUNTER — TELEPHONE (OUTPATIENT)
Dept: SURGERY | Facility: CLINIC | Age: 71
End: 2021-11-02

## 2021-11-02 DIAGNOSIS — I65.21 SYMPTOMATIC STENOSIS OF RIGHT CAROTID ARTERY WITHOUT INFARCTION: Primary | ICD-10-CM

## 2021-11-02 NOTE — TELEPHONE ENCOUNTER
Per pt reminder \"Patient of Dr. Jaycee Pulido due for follow up Carotid US for symptomatic R ICA stenosis s/p stent placement in December 2021. Order will need to be placed and patient will need to be called and reminded. \"    Order pended.

## 2021-11-03 ENCOUNTER — PATIENT MESSAGE (OUTPATIENT)
Dept: SURGERY | Facility: CLINIC | Age: 71
End: 2021-11-03

## 2021-11-03 NOTE — TELEPHONE ENCOUNTER
From: Juan Antonio Ricci  To: Yannick Montana MD, PhD  Sent: 11/3/2021 1:10 PM CDT  Subject: Scheduling an Ultrasound    Just received a message to schedule my arterial ultrasound but nothing available until the middle-end of December.   Would it be OK if I wait

## 2021-11-07 NOTE — DISCHARGE SUMMARY
[facility]  Discharge Summary    Maddie Alaniz Patient Status:  Inpatient    1950 MRN [id number]   [facility] 3NE-A Attending No att. providers found   Hosp Day # 2 PCP Nga Villanueva MD     Date of Admission: 10/20/2021    Date o (500 mg total) by mouth every 12 (twelve) hours for 8 days. , Normal, Disp-16 tablet, R-0    docusate sodium 100 MG Oral Cap  Take 100 mg by mouth 2 (two) times daily as needed for constipation. , Normal, Disp-30 capsule, R-0    metRONIDAZOLE 500 MG Oral Tab

## 2021-12-20 ENCOUNTER — LAB ENCOUNTER (OUTPATIENT)
Dept: LAB | Facility: HOSPITAL | Age: 71
End: 2021-12-20
Attending: STUDENT IN AN ORGANIZED HEALTH CARE EDUCATION/TRAINING PROGRAM
Payer: MEDICARE

## 2021-12-20 DIAGNOSIS — Z01.818 PRE-OP TESTING: ICD-10-CM

## 2021-12-21 ENCOUNTER — HOSPITAL ENCOUNTER (OUTPATIENT)
Dept: ULTRASOUND IMAGING | Age: 71
Discharge: HOME | End: 2021-12-21
Payer: MEDICARE

## 2021-12-21 DIAGNOSIS — I65.21 SYMPTOMATIC STENOSIS OF RIGHT CAROTID ARTERY WITHOUT INFARCTION: ICD-10-CM

## 2021-12-21 PROCEDURE — 93880 EXTRACRANIAL BILAT STUDY: CPT

## 2021-12-23 PROBLEM — Z80.0 FAMILY HISTORY OF COLON CANCER: Status: ACTIVE | Noted: 2021-12-23

## 2021-12-23 PROBLEM — K57.32 DIVERTICULITIS OF COLON: Status: ACTIVE | Noted: 2021-12-23

## 2022-08-19 ENCOUNTER — APPOINTMENT (OUTPATIENT)
Dept: CT IMAGING | Facility: HOSPITAL | Age: 72
End: 2022-08-19
Attending: EMERGENCY MEDICINE
Payer: MEDICARE

## 2022-08-19 ENCOUNTER — HOSPITAL ENCOUNTER (INPATIENT)
Facility: HOSPITAL | Age: 72
LOS: 3 days | Discharge: HOME | End: 2022-08-22
Attending: EMERGENCY MEDICINE | Admitting: INTERNAL MEDICINE
Payer: MEDICARE

## 2022-08-19 DIAGNOSIS — J36 TONSILLAR ABSCESS: Primary | ICD-10-CM

## 2022-08-19 LAB
ALBUMIN SERPL-MCNC: 3.4 G/DL (ref 3.4–5)
ALBUMIN/GLOB SERPL: 0.8 {RATIO} (ref 1–2)
ALP LIVER SERPL-CCNC: 60 U/L
ALT SERPL-CCNC: 15 U/L
ANION GAP SERPL CALC-SCNC: 3 MMOL/L (ref 0–18)
AST SERPL-CCNC: 11 U/L (ref 15–37)
BASOPHILS # BLD AUTO: 0.05 X10(3) UL (ref 0–0.2)
BASOPHILS NFR BLD AUTO: 0.3 %
BILIRUB SERPL-MCNC: 0.6 MG/DL (ref 0.1–2)
BUN BLD-MCNC: 12 MG/DL (ref 7–18)
CALCIUM BLD-MCNC: 9.7 MG/DL (ref 8.5–10.1)
CHLORIDE SERPL-SCNC: 106 MMOL/L (ref 98–112)
CO2 SERPL-SCNC: 27 MMOL/L (ref 21–32)
CREAT BLD-MCNC: 0.71 MG/DL
EOSINOPHIL # BLD AUTO: 0.11 X10(3) UL (ref 0–0.7)
EOSINOPHIL NFR BLD AUTO: 0.8 %
ERYTHROCYTE [DISTWIDTH] IN BLOOD BY AUTOMATED COUNT: 12.4 %
GFR SERPLBLD BASED ON 1.73 SQ M-ARVRAT: 99 ML/MIN/1.73M2 (ref 60–?)
GLOBULIN PLAS-MCNC: 4.2 G/DL (ref 2.8–4.4)
GLUCOSE BLD-MCNC: 87 MG/DL (ref 70–99)
HCT VFR BLD AUTO: 45.3 %
HGB BLD-MCNC: 15.4 G/DL
IMM GRANULOCYTES # BLD AUTO: 0.06 X10(3) UL (ref 0–1)
IMM GRANULOCYTES NFR BLD: 0.4 %
LACTATE SERPL-SCNC: 0.8 MMOL/L (ref 0.4–2)
LYMPHOCYTES # BLD AUTO: 1.88 X10(3) UL (ref 1–4)
LYMPHOCYTES NFR BLD AUTO: 13.1 %
MCH RBC QN AUTO: 34.4 PG (ref 26–34)
MCHC RBC AUTO-ENTMCNC: 34 G/DL (ref 31–37)
MCV RBC AUTO: 101.1 FL
MONOCYTES # BLD AUTO: 1.48 X10(3) UL (ref 0.1–1)
MONOCYTES NFR BLD AUTO: 10.3 %
NEUTROPHILS # BLD AUTO: 10.8 X10 (3) UL (ref 1.5–7.7)
NEUTROPHILS # BLD AUTO: 10.8 X10(3) UL (ref 1.5–7.7)
NEUTROPHILS NFR BLD AUTO: 75.1 %
OSMOLALITY SERPL CALC.SUM OF ELEC: 281 MOSM/KG (ref 275–295)
PLATELET # BLD AUTO: 205 10(3)UL (ref 150–450)
POTASSIUM SERPL-SCNC: 4.1 MMOL/L (ref 3.5–5.1)
PROT SERPL-MCNC: 7.6 G/DL (ref 6.4–8.2)
RBC # BLD AUTO: 4.48 X10(6)UL
SARS-COV-2 RNA RESP QL NAA+PROBE: NOT DETECTED
SODIUM SERPL-SCNC: 136 MMOL/L (ref 136–145)
WBC # BLD AUTO: 14.4 X10(3) UL (ref 4–11)

## 2022-08-19 PROCEDURE — 87040 BLOOD CULTURE FOR BACTERIA: CPT | Performed by: EMERGENCY MEDICINE

## 2022-08-19 PROCEDURE — 96375 TX/PRO/DX INJ NEW DRUG ADDON: CPT

## 2022-08-19 PROCEDURE — 80053 COMPREHEN METABOLIC PANEL: CPT | Performed by: EMERGENCY MEDICINE

## 2022-08-19 PROCEDURE — 36415 COLL VENOUS BLD VENIPUNCTURE: CPT

## 2022-08-19 PROCEDURE — 99285 EMERGENCY DEPT VISIT HI MDM: CPT

## 2022-08-19 PROCEDURE — 96376 TX/PRO/DX INJ SAME DRUG ADON: CPT

## 2022-08-19 PROCEDURE — 96365 THER/PROPH/DIAG IV INF INIT: CPT

## 2022-08-19 PROCEDURE — 83605 ASSAY OF LACTIC ACID: CPT | Performed by: EMERGENCY MEDICINE

## 2022-08-19 PROCEDURE — 96361 HYDRATE IV INFUSION ADD-ON: CPT

## 2022-08-19 PROCEDURE — 85025 COMPLETE CBC W/AUTO DIFF WBC: CPT | Performed by: EMERGENCY MEDICINE

## 2022-08-19 PROCEDURE — S0077 INJECTION, CLINDAMYCIN PHOSP: HCPCS | Performed by: EMERGENCY MEDICINE

## 2022-08-19 PROCEDURE — 70491 CT SOFT TISSUE NECK W/DYE: CPT | Performed by: EMERGENCY MEDICINE

## 2022-08-19 RX ORDER — HYDROMORPHONE HYDROCHLORIDE 1 MG/ML
1 INJECTION, SOLUTION INTRAMUSCULAR; INTRAVENOUS; SUBCUTANEOUS ONCE
Status: COMPLETED | OUTPATIENT
Start: 2022-08-19 | End: 2022-08-19

## 2022-08-19 RX ORDER — SENNOSIDES 8.6 MG
17.2 TABLET ORAL NIGHTLY PRN
Status: DISCONTINUED | OUTPATIENT
Start: 2022-08-19 | End: 2022-08-22

## 2022-08-19 RX ORDER — METOCLOPRAMIDE HYDROCHLORIDE 5 MG/ML
10 INJECTION INTRAMUSCULAR; INTRAVENOUS EVERY 8 HOURS PRN
Status: DISCONTINUED | OUTPATIENT
Start: 2022-08-19 | End: 2022-08-22

## 2022-08-19 RX ORDER — HYDROMORPHONE HYDROCHLORIDE 1 MG/ML
0.4 INJECTION, SOLUTION INTRAMUSCULAR; INTRAVENOUS; SUBCUTANEOUS EVERY 2 HOUR PRN
Status: DISCONTINUED | OUTPATIENT
Start: 2022-08-19 | End: 2022-08-22

## 2022-08-19 RX ORDER — CLINDAMYCIN PHOSPHATE 600 MG/50ML
600 INJECTION INTRAVENOUS EVERY 8 HOURS
Status: DISCONTINUED | OUTPATIENT
Start: 2022-08-20 | End: 2022-08-22

## 2022-08-19 RX ORDER — HYDROMORPHONE HYDROCHLORIDE 1 MG/ML
0.5 INJECTION, SOLUTION INTRAMUSCULAR; INTRAVENOUS; SUBCUTANEOUS ONCE
Status: COMPLETED | OUTPATIENT
Start: 2022-08-19 | End: 2022-08-19

## 2022-08-19 RX ORDER — SODIUM CHLORIDE 9 MG/ML
INJECTION, SOLUTION INTRAVENOUS CONTINUOUS
Status: ACTIVE | OUTPATIENT
Start: 2022-08-19 | End: 2022-08-19

## 2022-08-19 RX ORDER — TEMAZEPAM 15 MG/1
15 CAPSULE ORAL NIGHTLY PRN
Status: DISCONTINUED | OUTPATIENT
Start: 2022-08-19 | End: 2022-08-22

## 2022-08-19 RX ORDER — IOHEXOL 350 MG/ML
50 INJECTION, SOLUTION INTRAVENOUS
Status: COMPLETED | OUTPATIENT
Start: 2022-08-19 | End: 2022-08-19

## 2022-08-19 RX ORDER — ONDANSETRON 2 MG/ML
4 INJECTION INTRAMUSCULAR; INTRAVENOUS EVERY 6 HOURS PRN
Status: DISCONTINUED | OUTPATIENT
Start: 2022-08-19 | End: 2022-08-22

## 2022-08-19 RX ORDER — CLINDAMYCIN PHOSPHATE 600 MG/50ML
600 INJECTION INTRAVENOUS ONCE
Status: COMPLETED | OUTPATIENT
Start: 2022-08-19 | End: 2022-08-19

## 2022-08-19 RX ORDER — SODIUM PHOSPHATE, DIBASIC AND SODIUM PHOSPHATE, MONOBASIC 7; 19 G/133ML; G/133ML
1 ENEMA RECTAL ONCE AS NEEDED
Status: DISCONTINUED | OUTPATIENT
Start: 2022-08-19 | End: 2022-08-22

## 2022-08-19 RX ORDER — HYDROMORPHONE HYDROCHLORIDE 1 MG/ML
0.2 INJECTION, SOLUTION INTRAMUSCULAR; INTRAVENOUS; SUBCUTANEOUS EVERY 2 HOUR PRN
Status: DISCONTINUED | OUTPATIENT
Start: 2022-08-19 | End: 2022-08-22

## 2022-08-19 RX ORDER — HYDROCODONE BITARTRATE AND ACETAMINOPHEN 5; 325 MG/1; MG/1
1 TABLET ORAL EVERY 4 HOURS PRN
Status: DISCONTINUED | OUTPATIENT
Start: 2022-08-19 | End: 2022-08-22

## 2022-08-19 RX ORDER — POLYETHYLENE GLYCOL 3350 17 G/17G
17 POWDER, FOR SOLUTION ORAL DAILY PRN
Status: DISCONTINUED | OUTPATIENT
Start: 2022-08-19 | End: 2022-08-22

## 2022-08-19 RX ORDER — ENOXAPARIN SODIUM 100 MG/ML
40 INJECTION SUBCUTANEOUS DAILY
Status: DISCONTINUED | OUTPATIENT
Start: 2022-08-19 | End: 2022-08-22

## 2022-08-19 RX ORDER — HYDROMORPHONE HYDROCHLORIDE 1 MG/ML
0.5 INJECTION, SOLUTION INTRAMUSCULAR; INTRAVENOUS; SUBCUTANEOUS EVERY 30 MIN PRN
Status: DISCONTINUED | OUTPATIENT
Start: 2022-08-19 | End: 2022-08-19 | Stop reason: ALTCHOICE

## 2022-08-19 RX ORDER — ACETAMINOPHEN 325 MG/1
650 TABLET ORAL EVERY 4 HOURS PRN
Status: DISCONTINUED | OUTPATIENT
Start: 2022-08-19 | End: 2022-08-22

## 2022-08-19 RX ORDER — LEVOFLOXACIN 500 MG/1
500 TABLET, FILM COATED ORAL DAILY
COMMUNITY
Start: 2022-08-18 | End: 2022-08-22

## 2022-08-19 RX ORDER — ASPIRIN 81 MG/1
81 TABLET ORAL DAILY
COMMUNITY

## 2022-08-19 RX ORDER — HYDROCODONE BITARTRATE AND ACETAMINOPHEN 5; 325 MG/1; MG/1
2 TABLET ORAL EVERY 4 HOURS PRN
Status: DISCONTINUED | OUTPATIENT
Start: 2022-08-19 | End: 2022-08-22

## 2022-08-19 RX ORDER — HYDROMORPHONE HYDROCHLORIDE 1 MG/ML
0.8 INJECTION, SOLUTION INTRAMUSCULAR; INTRAVENOUS; SUBCUTANEOUS EVERY 2 HOUR PRN
Status: DISCONTINUED | OUTPATIENT
Start: 2022-08-19 | End: 2022-08-22

## 2022-08-19 RX ORDER — MORPHINE SULFATE 4 MG/ML
4 INJECTION, SOLUTION INTRAMUSCULAR; INTRAVENOUS ONCE
Status: DISCONTINUED | OUTPATIENT
Start: 2022-08-19 | End: 2022-08-19

## 2022-08-19 RX ORDER — DEXTROSE AND SODIUM CHLORIDE 5; .9 G/100ML; G/100ML
INJECTION, SOLUTION INTRAVENOUS CONTINUOUS
Status: DISCONTINUED | OUTPATIENT
Start: 2022-08-19 | End: 2022-08-22

## 2022-08-19 RX ORDER — BISACODYL 10 MG
10 SUPPOSITORY, RECTAL RECTAL
Status: DISCONTINUED | OUTPATIENT
Start: 2022-08-19 | End: 2022-08-22

## 2022-08-19 RX ORDER — ONDANSETRON 2 MG/ML
4 INJECTION INTRAMUSCULAR; INTRAVENOUS EVERY 4 HOURS PRN
Status: DISCONTINUED | OUTPATIENT
Start: 2022-08-19 | End: 2022-08-19 | Stop reason: ALTCHOICE

## 2022-08-19 NOTE — PROGRESS NOTES
NURSING ADMISSION NOTE      Patient admitted via Wheelchair  Oriented to room. Safety precautions initiated. Bed in low position. Call light in reach. A&Ox4. RA. C/o painful swallowing. NPO. Voids. C/o severe pain, managed per MAR. R side of neck noticeably swollen. SBA. POC discussed w/ pt and spouse. All questions answered and concerns addressed. Safety precautions in place. Frequent rounds performed.

## 2022-08-19 NOTE — ED QUICK NOTES
Orders for admission, patient is aware of plan and ready to go upstairs. Any questions, please call ED RN Gladys at extension [phone].      Patient Covid vaccination status: Fully vaccinated     COVID Test Ordered in ED: Rapid SARS-CoV-2 by PCR    COVID Suspicion at Admission: Low clinical suspicion for COVID    Running Infusions:      Mental Status/LOC at time of transport: A&O x3    Other pertinent information:   CIWA score: N/A   NIH score:  N/A

## 2022-08-20 LAB
ANION GAP SERPL CALC-SCNC: 2 MMOL/L (ref 0–18)
BASOPHILS # BLD AUTO: 0.03 X10(3) UL (ref 0–0.2)
BASOPHILS NFR BLD AUTO: 0.3 %
BUN BLD-MCNC: 10 MG/DL (ref 7–18)
CALCIUM BLD-MCNC: 8.6 MG/DL (ref 8.5–10.1)
CHLORIDE SERPL-SCNC: 109 MMOL/L (ref 98–112)
CO2 SERPL-SCNC: 27 MMOL/L (ref 21–32)
CREAT BLD-MCNC: 0.66 MG/DL
EOSINOPHIL # BLD AUTO: 0.1 X10(3) UL (ref 0–0.7)
EOSINOPHIL NFR BLD AUTO: 1 %
ERYTHROCYTE [DISTWIDTH] IN BLOOD BY AUTOMATED COUNT: 12.8 %
GFR SERPLBLD BASED ON 1.73 SQ M-ARVRAT: 102 ML/MIN/1.73M2 (ref 60–?)
GLUCOSE BLD-MCNC: 111 MG/DL (ref 70–99)
HCT VFR BLD AUTO: 38.1 %
HGB BLD-MCNC: 12.9 G/DL
IMM GRANULOCYTES # BLD AUTO: 0.05 X10(3) UL (ref 0–1)
IMM GRANULOCYTES NFR BLD: 0.5 %
LYMPHOCYTES # BLD AUTO: 2.25 X10(3) UL (ref 1–4)
LYMPHOCYTES NFR BLD AUTO: 23.3 %
MCH RBC QN AUTO: 34.6 PG (ref 26–34)
MCHC RBC AUTO-ENTMCNC: 33.9 G/DL (ref 31–37)
MCV RBC AUTO: 102.1 FL
MONOCYTES # BLD AUTO: 1.25 X10(3) UL (ref 0.1–1)
MONOCYTES NFR BLD AUTO: 13 %
NEUTROPHILS # BLD AUTO: 5.96 X10 (3) UL (ref 1.5–7.7)
NEUTROPHILS # BLD AUTO: 5.96 X10(3) UL (ref 1.5–7.7)
NEUTROPHILS NFR BLD AUTO: 61.9 %
OSMOLALITY SERPL CALC.SUM OF ELEC: 286 MOSM/KG (ref 275–295)
PLATELET # BLD AUTO: 181 10(3)UL (ref 150–450)
POTASSIUM SERPL-SCNC: 3.9 MMOL/L (ref 3.5–5.1)
RBC # BLD AUTO: 3.73 X10(6)UL
SODIUM SERPL-SCNC: 138 MMOL/L (ref 136–145)
WBC # BLD AUTO: 9.6 X10(3) UL (ref 4–11)

## 2022-08-20 PROCEDURE — 80048 BASIC METABOLIC PNL TOTAL CA: CPT | Performed by: INTERNAL MEDICINE

## 2022-08-20 PROCEDURE — 85025 COMPLETE CBC W/AUTO DIFF WBC: CPT | Performed by: INTERNAL MEDICINE

## 2022-08-20 PROCEDURE — 0CJY8ZZ INSPECTION OF MOUTH AND THROAT, VIA NATURAL OR ARTIFICIAL OPENING ENDOSCOPIC: ICD-10-PCS | Performed by: OTOLARYNGOLOGY

## 2022-08-20 PROCEDURE — S0077 INJECTION, CLINDAMYCIN PHOSP: HCPCS | Performed by: INTERNAL MEDICINE

## 2022-08-20 NOTE — PLAN OF CARE
Patient ambulated in lerma. VSS. Right sided neck swelling noted. Painful to swallow, no difficulty breathing. Denies chest/calf pain. Pain managed with IV pain medications. POC discussed, all questions and concerns addressed. Will monitor.        Problem: Patient/Family Goals  Goal: Patient/Family Long Term Goal  Description: Patient's Long Term Goal: discharge    Interventions:  - manage pain  - NPO  - ENT eval  - See additional Care Plan goals for specific interventions  Outcome: Progressing  Goal: Patient/Family Short Term Goal  Description: Patient's Short Term Goal: comfort    Interventions:   - prn pain meds  - prn antiemetics  - ice  - See additional Care Plan goals for specific interventions  Outcome: Progressing     Problem: PAIN - ADULT  Goal: Verbalizes/displays adequate comfort level or patient's stated pain goal  Description: INTERVENTIONS:  - Encourage pt to monitor pain and request assistance  - Assess pain using appropriate pain scale  - Administer analgesics based on type and severity of pain and evaluate response  - Implement non-pharmacological measures as appropriate and evaluate response  - Consider cultural and social influences on pain and pain management  - Manage/alleviate anxiety  - Utilize distraction and/or relaxation techniques  - Monitor for opioid side effects  - Notify MD/LIP if interventions unsuccessful or patient reports new pain  - Anticipate increased pain with activity and pre-medicate as appropriate  Outcome: Progressing     Problem: RISK FOR INFECTION - ADULT  Goal: Absence of fever/infection during anticipated neutropenic period  Description: INTERVENTIONS  - Monitor WBC  - Administer growth factors as ordered  - Implement neutropenic guidelines  Outcome: Progressing     Problem: SAFETY ADULT - FALL  Goal: Free from fall injury  Description: INTERVENTIONS:  - Assess pt frequently for physical needs  - Identify cognitive and physical deficits and behaviors that affect risk of falls.  - [place] fall precautions as indicated by assessment.  - Educate pt/family on patient safety including physical limitations  - Instruct pt to call for assistance with activity based on assessment  - Modify environment to reduce risk of injury  - Provide assistive devices as appropriate  - Consider OT/PT consult to assist with strengthening/mobility  - Encourage toileting schedule  Outcome: Progressing     Problem: DISCHARGE PLANNING  Goal: Discharge to home or other facility with appropriate resources  Description: INTERVENTIONS:  - Identify barriers to discharge w/pt and caregiver  - Include patient/family/discharge partner in discharge planning  - Arrange for needed discharge resources and transportation as appropriate  - Identify discharge learning needs (meds, wound care, etc)  - Arrange for interpreters to assist at discharge as needed  - Consider post-discharge preferences of patient/family/discharge partner  - Complete POLST form as appropriate  - Assess patient's ability to be responsible for managing their own health  - Refer to Case Management Department for coordinating discharge planning if the patient needs post-hospital services based on physician/LIP order or complex needs related to functional status, cognitive ability or social support system  Outcome: Progressing

## 2022-08-20 NOTE — PLAN OF CARE
Upon assessment pt is a&o x4, VSS and afebrile. Pt denies calf pain, chest pain and SANDRA. RA, . On Lovenox subQ. Strict NPO, denies n/v. Voids. Up with SBA. Dilaudid given for pain with adequate relief. Swelling/ tenderness noted to R side of neck. IV fluids/ abx infusing per MAR. Pt resting in bed with call light within reach and safety precautions in place. Will continue to monitor.      Problem: Patient/Family Goals  Goal: Patient/Family Long Term Goal  Description: Patient's Long Term Goal: discharge    Interventions:  - manage pain  - NPO  - ENT eval  - See additional Care Plan goals for specific interventions  Outcome: Progressing  Goal: Patient/Family Short Term Goal  Description: Patient's Short Term Goal: comfort    Interventions:   - prn pain meds  - prn antiemetics  - ice  - See additional Care Plan goals for specific interventions  Outcome: Progressing     Problem: PAIN - ADULT  Goal: Verbalizes/displays adequate comfort level or patient's stated pain goal  Description: INTERVENTIONS:  - Encourage pt to monitor pain and request assistance  - Assess pain using appropriate pain scale  - Administer analgesics based on type and severity of pain and evaluate response  - Implement non-pharmacological measures as appropriate and evaluate response  - Consider cultural and social influences on pain and pain management  - Manage/alleviate anxiety  - Utilize distraction and/or relaxation techniques  - Monitor for opioid side effects  - Notify MD/LIP if interventions unsuccessful or patient reports new pain  - Anticipate increased pain with activity and pre-medicate as appropriate  Outcome: Progressing     Problem: RISK FOR INFECTION - ADULT  Goal: Absence of fever/infection during anticipated neutropenic period  Description: INTERVENTIONS  - Monitor WBC  - Administer growth factors as ordered  - Implement neutropenic guidelines  Outcome: Progressing     Problem: SAFETY ADULT - FALL  Goal: Free from fall injury  Description: INTERVENTIONS:  - Assess pt frequently for physical needs  - Identify cognitive and physical deficits and behaviors that affect risk of falls.   - [place] fall precautions as indicated by assessment.  - Educate pt/family on patient safety including physical limitations  - Instruct pt to call for assistance with activity based on assessment  - Modify environment to reduce risk of injury  - Provide assistive devices as appropriate  - Consider OT/PT consult to assist with strengthening/mobility  - Encourage toileting schedule  Outcome: Progressing     Problem: DISCHARGE PLANNING  Goal: Discharge to home or other facility with appropriate resources  Description: INTERVENTIONS:  - Identify barriers to discharge w/pt and caregiver  - Include patient/family/discharge partner in discharge planning  - Arrange for needed discharge resources and transportation as appropriate  - Identify discharge learning needs (meds, wound care, etc)  - Arrange for interpreters to assist at discharge as needed  - Consider post-discharge preferences of patient/family/discharge partner  - Complete POLST form as appropriate  - Assess patient's ability to be responsible for managing their own health  - Refer to Case Management Department for coordinating discharge planning if the patient needs post-hospital services based on physician/LIP order or complex needs related to functional status, cognitive ability or social support system  Outcome: Progressing

## 2022-08-21 PROCEDURE — S0077 INJECTION, CLINDAMYCIN PHOSP: HCPCS | Performed by: INTERNAL MEDICINE

## 2022-08-21 NOTE — PLAN OF CARE
Problem: Patient/Family Goals  Goal: Patient/Family Long Term Goal  Description: Patient's Long Term Goal: discharge    Interventions:  - manage pain  -tolerate diet  --- See additional Care Plan goals for specific interventions  Outcome: Progressing  Goal: Patient/Family Short Term Goal  Description: Patient's Short Term Goal: comfort    Interventions:   - prn pain meds  - prn antiemetics  - ice  - See additional Care Plan goals for specific interventions  Outcome: Progressing     Problem: PAIN - ADULT  Goal: Verbalizes/displays adequate comfort level or patient's stated pain goal  Description: INTERVENTIONS:  - Encourage pt to monitor pain and request assistance  - Assess pain using appropriate pain scale  - Administer analgesics based on type and severity of pain and evaluate response  - Implement non-pharmacological measures as appropriate and evaluate response  - Consider cultural and social influences on pain and pain management  - Manage/alleviate anxiety  - Utilize distraction and/or relaxation techniques  - Monitor for opioid side effects  - Notify MD/LIP if interventions unsuccessful or patient reports new pain  - Anticipate increased pain with activity and pre-medicate as appropriate  Outcome: Progressing     Problem: RISK FOR INFECTION - ADULT  Goal: Absence of fever/infection during anticipated neutropenic period  Description: INTERVENTIONS  - Monitor WBC  - Administer growth factors as ordered  - Implement neutropenic guidelines  Outcome: Progressing     Problem: SAFETY ADULT - FALL  Goal: Free from fall injury  Description: INTERVENTIONS:  - Assess pt frequently for physical needs  - Identify cognitive and physical deficits and behaviors that affect risk of falls.   - [place] fall precautions as indicated by assessment.  - Educate pt/family on patient safety including physical limitations  - Instruct pt to call for assistance with activity based on assessment  - Modify environment to reduce risk of injury  - Provide assistive devices as appropriate  - Consider OT/PT consult to assist with strengthening/mobility  - Encourage toileting schedule  Outcome: Progressing     Problem: DISCHARGE PLANNING  Goal: Discharge to home or other facility with appropriate resources  Description: INTERVENTIONS:  - Identify barriers to discharge w/pt and caregiver  - Include patient/family/discharge partner in discharge planning  - Arrange for needed discharge resources and transportation as appropriate  - Identify discharge learning needs (meds, wound care, etc)  - Arrange for interpreters to assist at discharge as needed  - Consider post-discharge preferences of patient/family/discharge partner  - Complete POLST form as appropriate  - Assess patient's ability to be responsible for managing their own health  - Refer to Case Management Department for coordinating discharge planning if the patient needs post-hospital services based on physician/LIP order or complex needs related to functional status, cognitive ability or social support system  Outcome: Not Progressing   Alert & oriented x4. With tolerable pain. Right side of neck slightly swollen ,warm & tender to touch. Throat pain when swallowing lesser per patient. With chronic back pain and left leg pain. Back of left  thigh,calf and left buttocks with chronic numbness per patient from previous surgeries. Ambulation in hallways with standby assist encouraged. Plan of care discussed with patient. Will monitor.

## 2022-08-21 NOTE — PLAN OF CARE
PT axox4, resting in bed, ambulating in the hallway, pt on oral pain meds, PIV infusing D5 0/9 at 100ml/hr, tolerating diet, passing gas and belching, pt care plan updated with patient will continue to monitor patient. Problem: Patient/Family Goals  Goal: Patient/Family Long Term Goal  Description: Patient's Long Term Goal: discharge    Interventions:  - manage pain  - NPO  - ENT eval  - See additional Care Plan goals for specific interventions  Outcome: Progressing  Goal: Patient/Family Short Term Goal  Description: Patient's Short Term Goal: comfort    Interventions:   - prn pain meds  - prn antiemetics  - ice  - See additional Care Plan goals for specific interventions  Outcome: Progressing     Problem: PAIN - ADULT  Goal: Verbalizes/displays adequate comfort level or patient's stated pain goal  Description: INTERVENTIONS:  - Encourage pt to monitor pain and request assistance  - Assess pain using appropriate pain scale  - Administer analgesics based on type and severity of pain and evaluate response  - Implement non-pharmacological measures as appropriate and evaluate response  - Consider cultural and social influences on pain and pain management  - Manage/alleviate anxiety  - Utilize distraction and/or relaxation techniques  - Monitor for opioid side effects  - Notify MD/LIP if interventions unsuccessful or patient reports new pain  - Anticipate increased pain with activity and pre-medicate as appropriate  Outcome: Progressing     Problem: RISK FOR INFECTION - ADULT  Goal: Absence of fever/infection during anticipated neutropenic period  Description: INTERVENTIONS  - Monitor WBC  - Administer growth factors as ordered  - Implement neutropenic guidelines  Outcome: Progressing     Problem: SAFETY ADULT - FALL  Goal: Free from fall injury  Description: INTERVENTIONS:  - Assess pt frequently for physical needs  - Identify cognitive and physical deficits and behaviors that affect risk of falls.   - [place] fall precautions as indicated by assessment.  - Educate pt/family on patient safety including physical limitations  - Instruct pt to call for assistance with activity based on assessment  - Modify environment to reduce risk of injury  - Provide assistive devices as appropriate  - Consider OT/PT consult to assist with strengthening/mobility  - Encourage toileting schedule  Outcome: Progressing     Problem: DISCHARGE PLANNING  Goal: Discharge to home or other facility with appropriate resources  Description: INTERVENTIONS:  - Identify barriers to discharge w/pt and caregiver  - Include patient/family/discharge partner in discharge planning  - Arrange for needed discharge resources and transportation as appropriate  - Identify discharge learning needs (meds, wound care, etc)  - Arrange for interpreters to assist at discharge as needed  - Consider post-discharge preferences of patient/family/discharge partner  - Complete POLST form as appropriate  - Assess patient's ability to be responsible for managing their own health  - Refer to Case Management Department for coordinating discharge planning if the patient needs post-hospital services based on physician/LIP order or complex needs related to functional status, cognitive ability or social support system  Outcome: Progressing

## 2022-08-22 VITALS
OXYGEN SATURATION: 95 % | HEIGHT: 66 IN | SYSTOLIC BLOOD PRESSURE: 107 MMHG | RESPIRATION RATE: 18 BRPM | WEIGHT: 160 LBS | DIASTOLIC BLOOD PRESSURE: 51 MMHG | TEMPERATURE: 98 F | HEART RATE: 55 BPM | BODY MASS INDEX: 25.71 KG/M2

## 2022-08-22 PROCEDURE — S0077 INJECTION, CLINDAMYCIN PHOSP: HCPCS | Performed by: INTERNAL MEDICINE

## 2022-08-22 RX ORDER — METHYLPREDNISOLONE 4 MG/1
4 TABLET ORAL
Status: DISCONTINUED | OUTPATIENT
Start: 2022-08-25 | End: 2022-08-22

## 2022-08-22 RX ORDER — METHYLPREDNISOLONE 4 MG/1
8 TABLET ORAL
Status: DISCONTINUED | OUTPATIENT
Start: 2022-08-23 | End: 2022-08-22

## 2022-08-22 RX ORDER — METHYLPREDNISOLONE 4 MG/1
8 TABLET ORAL
Status: DISCONTINUED | OUTPATIENT
Start: 2022-08-22 | End: 2022-08-22

## 2022-08-22 RX ORDER — METHYLPREDNISOLONE 4 MG/1
4 TABLET ORAL
Status: DISCONTINUED | OUTPATIENT
Start: 2022-08-27 | End: 2022-08-22

## 2022-08-22 RX ORDER — METHYLPREDNISOLONE 4 MG/1
4 TABLET ORAL
Status: DISCONTINUED | OUTPATIENT
Start: 2022-08-22 | End: 2022-08-22

## 2022-08-22 RX ORDER — METHYLPREDNISOLONE 4 MG/1
4 TABLET ORAL
Status: DISCONTINUED | OUTPATIENT
Start: 2022-08-23 | End: 2022-08-22

## 2022-08-22 RX ORDER — METHYLPREDNISOLONE 4 MG/1
4 TABLET ORAL NIGHTLY
Status: DISCONTINUED | OUTPATIENT
Start: 2022-08-26 | End: 2022-08-22

## 2022-08-22 RX ORDER — CLINDAMYCIN HYDROCHLORIDE 150 MG/1
300 CAPSULE ORAL EVERY 6 HOURS SCHEDULED
Status: DISCONTINUED | OUTPATIENT
Start: 2022-08-22 | End: 2022-08-22

## 2022-08-22 RX ORDER — METHYLPREDNISOLONE 4 MG/1
4 TABLET ORAL
Status: DISCONTINUED | OUTPATIENT
Start: 2022-08-26 | End: 2022-08-22

## 2022-08-22 RX ORDER — METHYLPREDNISOLONE 4 MG/1
TABLET ORAL
Qty: 21 TABLET | Refills: 0 | Status: SHIPPED | OUTPATIENT
Start: 2022-08-22

## 2022-08-22 RX ORDER — CLINDAMYCIN HYDROCHLORIDE 300 MG/1
300 CAPSULE ORAL 4 TIMES DAILY
Qty: 28 CAPSULE | Refills: 0 | Status: SHIPPED | OUTPATIENT
Start: 2022-08-22

## 2022-08-22 RX ORDER — METHYLPREDNISOLONE 4 MG/1
4 TABLET ORAL
Status: DISCONTINUED | OUTPATIENT
Start: 2022-08-24 | End: 2022-08-22

## 2022-08-22 NOTE — DISCHARGE SUMMARY
[facility]  Discharge Summary    Jazmyne Shaw Patient Status:  Inpatient    1950 MRN [id number]   [facility] 3NW-A Attending Lisa Garcia MD   [facility] Day # 3 PCP Marisa Garcia MD     Date of Admission: 2022    Date of Discharge: 2022.     Admitting Diagnosis: Tonsillar abscess [J36]    Discharge Diagnosis:    RIGHT SIDED NECK MASS-TENDER  ACUTE TONSILLITIS- R/O RETROPHARYNGEAL ABSCESS  CHRONIC PAIN SYNDROME     Patient Active Problem List:     Reflex sympathetic dystrophy of the lower limb     Lumbar radiculopathy     Lumbar spinal stenosis     Arthropathy of lumbar facet joint     Long-term current use of opiate analgesic     Status post cervical spinal fusion     Cervical radiculitis     Allergic rhinitis     Depression     GERD (gastroesophageal reflux disease)     Hyperlipidemia     Hypothyroidism     PVD (peripheral vascular disease) ([facility])     PAD (peripheral artery disease) ([facility])     Urolithiasis     Renal calculi     CAD (coronary artery disease)     Dyslipidemia     Spinal stenosis     Chronic back pain     Hypercholesterolemia     Chronic leg pain     CRPS (complex regional pain syndrome type II)     Vitamin D deficiency     Ureterolithiasis     History of hypertension     Varicose veins     Chronic venous insufficiency     Hx of colonic polyp     History of BPH     Carotid artery stenosis     Herniated disc     Symptomatic stenosis of right carotid artery without infarction     Hypotension     Chronic pain due to trauma     RLQ abdominal pain     Marginal corneal ulcer of right eye     Diverticulitis large intestine w/o perforation or abscess w/bleeding     Diverticulitis of colon     Family history of colon cancer     Tonsillar abscess      Reason for Admission: Right neck pain and swelling     Physical Exam: See progress note          Disposition: Home     Discharge Condition: Stable    Discharge Medications: Current Discharge Medication List    START taking these medications    clindamycin 300 MG Oral Cap  Take 1 capsule (300 mg total) by mouth 4 (four) times daily. Qty: 28 capsule Refills: 0    methylPREDNISolone 4 MG Oral Tablet Therapy Pack  Take as directed on dose pack instructions  Qty: 21 tablet Refills: 0      CONTINUE these medications which have NOT CHANGED    aspirin (ASPIRIN 81) 81 MG Oral Tab EC  Take 81 mg by mouth daily. HYDROcodone-acetaminophen  MG Oral Tab  Take 1 tablet by mouth every 4 (four) hours as needed for Pain. Max 5 a day  Qty: 135 tablet Refills: 0      STOP taking these medications    levoFLOXacin 500 MG Oral Tab          Follow up Visits: Follow-up with Physicians as directed.         Joe Spicer MD  8/22/2022  8:57 AM

## 2022-08-22 NOTE — PLAN OF CARE
Pt axox4, resting in bed ambulating in the hallway, pt voiding, IV abx, pt has chronic back and leg pain, oral pain meds given, abdomen soft non-tender, lungs clear, pt care plan updated with patient will continue to monitor patient. Problem: Patient/Family Goals  Goal: Patient/Family Long Term Goal  Description: Patient's Long Term Goal: discharge    Interventions:  - manage pain  - NPO  - ENT eval  - See additional Care Plan goals for specific interventions  Outcome: Progressing  Goal: Patient/Family Short Term Goal  Description: Patient's Short Term Goal: comfort    Interventions:   - prn pain meds  - prn antiemetics  - ice  - See additional Care Plan goals for specific interventions  Outcome: Progressing     Problem: PAIN - ADULT  Goal: Verbalizes/displays adequate comfort level or patient's stated pain goal  Description: INTERVENTIONS:  - Encourage pt to monitor pain and request assistance  - Assess pain using appropriate pain scale  - Administer analgesics based on type and severity of pain and evaluate response  - Implement non-pharmacological measures as appropriate and evaluate response  - Consider cultural and social influences on pain and pain management  - Manage/alleviate anxiety  - Utilize distraction and/or relaxation techniques  - Monitor for opioid side effects  - Notify MD/LIP if interventions unsuccessful or patient reports new pain  - Anticipate increased pain with activity and pre-medicate as appropriate  Outcome: Progressing     Problem: RISK FOR INFECTION - ADULT  Goal: Absence of fever/infection during anticipated neutropenic period  Description: INTERVENTIONS  - Monitor WBC  - Administer growth factors as ordered  - Implement neutropenic guidelines  Outcome: Progressing     Problem: SAFETY ADULT - FALL  Goal: Free from fall injury  Description: INTERVENTIONS:  - Assess pt frequently for physical needs  - Identify cognitive and physical deficits and behaviors that affect risk of falls.   - [place] fall precautions as indicated by assessment.  - Educate pt/family on patient safety including physical limitations  - Instruct pt to call for assistance with activity based on assessment  - Modify environment to reduce risk of injury  - Provide assistive devices as appropriate  - Consider OT/PT consult to assist with strengthening/mobility  - Encourage toileting schedule  Outcome: Progressing     Problem: DISCHARGE PLANNING  Goal: Discharge to home or other facility with appropriate resources  Description: INTERVENTIONS:  - Identify barriers to discharge w/pt and caregiver  - Include patient/family/discharge partner in discharge planning  - Arrange for needed discharge resources and transportation as appropriate  - Identify discharge learning needs (meds, wound care, etc)  - Arrange for interpreters to assist at discharge as needed  - Consider post-discharge preferences of patient/family/discharge partner  - Complete POLST form as appropriate  - Assess patient's ability to be responsible for managing their own health  - Refer to Case Management Department for coordinating discharge planning if the patient needs post-hospital services based on physician/LIP order or complex needs related to functional status, cognitive ability or social support system  Outcome: Progressing

## 2022-08-22 NOTE — PLAN OF CARE
Patient is alert and oriented x3. Up ad yesica   Voiding freely. Denies nausea or vomiting. Pain controlled  Tolerating diet      Patient assessed and ready for DC home  All instructions given to patient for home  All questions answered to patients level of satisfaction. PIV removed and intact         NURSING DISCHARGE NOTE    Discharged Home via Ambulatory. Accompanied by Spouse  Belongings Taken by patient/family.       Problem: Patient/Family Goals  Goal: Patient/Family Long Term Goal  Description: Patient's Long Term Goal: discharge    Interventions:  - manage pain  - NPO  - ENT eval  - See additional Care Plan goals for specific interventions  Outcome: Adequate for Discharge  Goal: Patient/Family Short Term Goal  Description: Patient's Short Term Goal: comfort    Interventions:   - prn pain meds  - prn antiemetics  - ice  - See additional Care Plan goals for specific interventions  Outcome: Adequate for Discharge     Problem: PAIN - ADULT  Goal: Verbalizes/displays adequate comfort level or patient's stated pain goal  Description: INTERVENTIONS:  - Encourage pt to monitor pain and request assistance  - Assess pain using appropriate pain scale  - Administer analgesics based on type and severity of pain and evaluate response  - Implement non-pharmacological measures as appropriate and evaluate response  - Consider cultural and social influences on pain and pain management  - Manage/alleviate anxiety  - Utilize distraction and/or relaxation techniques  - Monitor for opioid side effects  - Notify MD/LIP if interventions unsuccessful or patient reports new pain  - Anticipate increased pain with activity and pre-medicate as appropriate  Outcome: Adequate for Discharge     Problem: RISK FOR INFECTION - ADULT  Goal: Absence of fever/infection during anticipated neutropenic period  Description: INTERVENTIONS  - Monitor WBC  - Administer growth factors as ordered  - Implement neutropenic guidelines  Outcome: Adequate for Discharge     Problem: SAFETY ADULT - FALL  Goal: Free from fall injury  Description: INTERVENTIONS:  - Assess pt frequently for physical needs  - Identify cognitive and physical deficits and behaviors that affect risk of falls.   - [place] fall precautions as indicated by assessment.  - Educate pt/family on patient safety including physical limitations  - Instruct pt to call for assistance with activity based on assessment  - Modify environment to reduce risk of injury  - Provide assistive devices as appropriate  - Consider OT/PT consult to assist with strengthening/mobility  - Encourage toileting schedule  Outcome: Adequate for Discharge     Problem: DISCHARGE PLANNING  Goal: Discharge to home or other facility with appropriate resources  Description: INTERVENTIONS:  - Identify barriers to discharge w/pt and caregiver  - Include patient/family/discharge partner in discharge planning  - Arrange for needed discharge resources and transportation as appropriate  - Identify discharge learning needs (meds, wound care, etc)  - Arrange for interpreters to assist at discharge as needed  - Consider post-discharge preferences of patient/family/discharge partner  - Complete POLST form as appropriate  - Assess patient's ability to be responsible for managing their own health  - Refer to Case Management Department for coordinating discharge planning if the patient needs post-hospital services based on physician/LIP order or complex needs related to functional status, cognitive ability or social support system  Outcome: Adequate for Discharge

## 2022-09-09 ENCOUNTER — HOSPITAL ENCOUNTER (OUTPATIENT)
Dept: CT IMAGING | Facility: HOSPITAL | Age: 72
Discharge: HOME | End: 2022-09-09
Attending: INTERNAL MEDICINE
Payer: MEDICARE

## 2022-09-09 DIAGNOSIS — J36 PERITONSILLAR ABSCESS: ICD-10-CM

## 2022-09-09 PROCEDURE — 70491 CT SOFT TISSUE NECK W/DYE: CPT | Performed by: INTERNAL MEDICINE

## 2022-09-09 RX ORDER — IOHEXOL 350 MG/ML
50 INJECTION, SOLUTION INTRAVENOUS
Status: COMPLETED | OUTPATIENT
Start: 2022-09-09 | End: 2022-09-09

## 2022-09-09 RX ADMIN — IOHEXOL 50 ML: 350 INJECTION, SOLUTION INTRAVENOUS at 14:40:00

## 2024-11-30 NOTE — PROGRESS NOTES
Patient A&O x 4. Up walking halls with wife. Pt pain marginally improved as compared to yesterday, per pt. Iv antibiotics and pain meds. Pt asking about starting a diet today. All needs met at this time, will continue to monitor. No

## 2025-07-14 RX ORDER — GABAPENTIN 600 MG/1
600 TABLET ORAL 3 TIMES DAILY
COMMUNITY

## 2025-07-17 ENCOUNTER — HOSPITAL ENCOUNTER (OUTPATIENT)
Dept: GENERAL RADIOLOGY | Facility: HOSPITAL | Age: 75
Discharge: HOME | End: 2025-07-17
Attending: INTERNAL MEDICINE
Payer: MEDICARE

## 2025-07-17 ENCOUNTER — LAB ENCOUNTER (OUTPATIENT)
Dept: LAB | Facility: HOSPITAL | Age: 75
End: 2025-07-17
Attending: INTERNAL MEDICINE
Payer: MEDICARE

## 2025-07-17 DIAGNOSIS — Z01.818 PRE-OP TESTING: ICD-10-CM

## 2025-07-17 DIAGNOSIS — Z01.818 ENCOUNTER FOR PREADMISSION TESTING: ICD-10-CM

## 2025-07-17 LAB
ALBUMIN SERPL-MCNC: 4.8 G/DL (ref 3.2–4.8)
ALBUMIN/GLOB SERPL: 1.7 (ref 1–2)
ALP LIVER SERPL-CCNC: 77 U/L (ref 45–117)
ALT SERPL-CCNC: 12 U/L (ref 10–49)
ANION GAP SERPL CALC-SCNC: 4 MMOL/L (ref 0–18)
APTT PPP: 29.8 SECONDS (ref 23–36)
AST SERPL-CCNC: 20 U/L (ref ?–34)
ATRIAL RATE: 67 BPM
BASOPHILS # BLD AUTO: 0.04 X10(3) UL (ref 0–0.2)
BASOPHILS NFR BLD AUTO: 0.5 %
BILIRUB SERPL-MCNC: 0.4 MG/DL (ref 0.2–1.1)
BILIRUB UR QL STRIP.AUTO: NEGATIVE
BUN BLD-MCNC: 16 MG/DL (ref 9–23)
CALCIUM BLD-MCNC: 9.9 MG/DL (ref 8.7–10.6)
CHLORIDE SERPL-SCNC: 105 MMOL/L (ref 98–112)
CLARITY UR REFRACT.AUTO: CLEAR
CO2 SERPL-SCNC: 31 MMOL/L (ref 21–32)
CREAT BLD-MCNC: 1.01 MG/DL (ref 0.7–1.3)
EGFRCR SERPLBLD CKD-EPI 2021: 79 ML/MIN/1.73M2 (ref 60–?)
EOSINOPHIL # BLD AUTO: 0.13 X10(3) UL (ref 0–0.7)
EOSINOPHIL NFR BLD AUTO: 1.5 %
ERYTHROCYTE [DISTWIDTH] IN BLOOD BY AUTOMATED COUNT: 12.9 %
FASTING STATUS PATIENT QL REPORTED: YES
GLOBULIN PLAS-MCNC: 2.8 G/DL (ref 2–3.5)
GLUCOSE BLD-MCNC: 115 MG/DL (ref 70–99)
GLUCOSE UR STRIP.AUTO-MCNC: NORMAL MG/DL
HCT VFR BLD AUTO: 46.5 % (ref 39–53)
HGB BLD-MCNC: 15.8 G/DL (ref 13–17.5)
IMM GRANULOCYTES # BLD AUTO: 0.03 X10(3) UL (ref 0–1)
IMM GRANULOCYTES NFR BLD: 0.4 %
INR BLD: 0.9 (ref 0.8–1.2)
KETONES UR STRIP.AUTO-MCNC: NEGATIVE MG/DL
LEUKOCYTE ESTERASE UR QL STRIP.AUTO: NEGATIVE
LYMPHOCYTES # BLD AUTO: 1.99 X10(3) UL (ref 1–4)
LYMPHOCYTES NFR BLD AUTO: 23.7 %
MCH RBC QN AUTO: 34.1 PG (ref 26–34)
MCHC RBC AUTO-ENTMCNC: 34 G/DL (ref 31–37)
MCV RBC AUTO: 100.4 FL (ref 80–100)
MONOCYTES # BLD AUTO: 0.83 X10(3) UL (ref 0.1–1)
MONOCYTES NFR BLD AUTO: 9.9 %
NEUTROPHILS # BLD AUTO: 5.38 X10 (3) UL (ref 1.5–7.7)
NEUTROPHILS # BLD AUTO: 5.38 X10(3) UL (ref 1.5–7.7)
NEUTROPHILS NFR BLD AUTO: 64 %
NITRITE UR QL STRIP.AUTO: NEGATIVE
OSMOLALITY SERPL CALC.SUM OF ELEC: 292 MOSM/KG (ref 275–295)
P AXIS: 67 DEGREES
P-R INTERVAL: 130 MS
PH UR STRIP.AUTO: 7 (ref 5–8)
PLATELET # BLD AUTO: 224 10(3)UL (ref 150–450)
POTASSIUM SERPL-SCNC: 5.2 MMOL/L (ref 3.5–5.1)
PROT SERPL-MCNC: 7.6 G/DL (ref 5.7–8.2)
PROT UR STRIP.AUTO-MCNC: NEGATIVE MG/DL
PROTHROMBIN TIME: 12.3 SECONDS (ref 11.6–14.8)
Q-T INTERVAL: 384 MS
QRS DURATION: 80 MS
QTC CALCULATION (BEZET): 405 MS
R AXIS: 58 DEGREES
RBC # BLD AUTO: 4.63 X10(6)UL (ref 3.8–5.8)
RBC UR QL AUTO: NEGATIVE
SODIUM SERPL-SCNC: 140 MMOL/L (ref 136–145)
SP GR UR STRIP.AUTO: 1.01 (ref 1–1.03)
T AXIS: 60 DEGREES
UROBILINOGEN UR STRIP.AUTO-MCNC: NORMAL MG/DL
VENTRICULAR RATE: 67 BPM
WBC # BLD AUTO: 8.4 X10(3) UL (ref 4–11)

## 2025-07-17 PROCEDURE — 93010 ELECTROCARDIOGRAM REPORT: CPT | Performed by: INTERNAL MEDICINE

## 2025-07-17 PROCEDURE — 80053 COMPREHEN METABOLIC PANEL: CPT

## 2025-07-17 PROCEDURE — 87081 CULTURE SCREEN ONLY: CPT

## 2025-07-17 PROCEDURE — 36415 COLL VENOUS BLD VENIPUNCTURE: CPT

## 2025-07-17 PROCEDURE — 93005 ELECTROCARDIOGRAM TRACING: CPT

## 2025-07-17 PROCEDURE — 81003 URINALYSIS AUTO W/O SCOPE: CPT

## 2025-07-17 PROCEDURE — 85610 PROTHROMBIN TIME: CPT

## 2025-07-17 PROCEDURE — 85730 THROMBOPLASTIN TIME PARTIAL: CPT

## 2025-07-17 PROCEDURE — 71046 X-RAY EXAM CHEST 2 VIEWS: CPT | Performed by: INTERNAL MEDICINE

## 2025-07-17 PROCEDURE — 85025 COMPLETE CBC W/AUTO DIFF WBC: CPT

## 2025-07-30 ENCOUNTER — LAB ENCOUNTER (OUTPATIENT)
Dept: LAB | Facility: HOSPITAL | Age: 75
End: 2025-07-30
Attending: INTERNAL MEDICINE

## 2025-07-30 DIAGNOSIS — Z01.818 PREOP EXAMINATION: ICD-10-CM

## 2025-07-30 LAB
ANION GAP SERPL CALC-SCNC: 6 MMOL/L (ref 0–18)
BUN BLD-MCNC: 17 MG/DL (ref 9–23)
CALCIUM BLD-MCNC: 10.1 MG/DL (ref 8.7–10.6)
CHLORIDE SERPL-SCNC: 105 MMOL/L (ref 98–112)
CO2 SERPL-SCNC: 28 MMOL/L (ref 21–32)
CREAT BLD-MCNC: 0.93 MG/DL (ref 0.7–1.3)
EGFRCR SERPLBLD CKD-EPI 2021: 87 ML/MIN/1.73M2 (ref 60–?)
FASTING STATUS PATIENT QL REPORTED: NO
GLUCOSE BLD-MCNC: 119 MG/DL (ref 70–99)
OSMOLALITY SERPL CALC.SUM OF ELEC: 291 MOSM/KG (ref 275–295)
POTASSIUM SERPL-SCNC: 5 MMOL/L (ref 3.5–5.1)
SODIUM SERPL-SCNC: 139 MMOL/L (ref 136–145)

## 2025-07-30 PROCEDURE — 36415 COLL VENOUS BLD VENIPUNCTURE: CPT

## 2025-07-30 PROCEDURE — 80048 BASIC METABOLIC PNL TOTAL CA: CPT

## 2025-08-13 ENCOUNTER — HOSPITAL ENCOUNTER (INPATIENT)
Facility: HOSPITAL | Age: 75
LOS: 3 days | Discharge: HOME HEALTH | End: 2025-08-16
Attending: ORTHOPAEDIC SURGERY | Admitting: ORTHOPAEDIC SURGERY

## 2025-08-13 ENCOUNTER — APPOINTMENT (OUTPATIENT)
Dept: GENERAL RADIOLOGY | Facility: HOSPITAL | Age: 75
End: 2025-08-13
Attending: ORTHOPAEDIC SURGERY

## 2025-08-13 ENCOUNTER — ANESTHESIA (OUTPATIENT)
Dept: SURGERY | Facility: HOSPITAL | Age: 75
End: 2025-08-13

## 2025-08-13 ENCOUNTER — ANESTHESIA EVENT (OUTPATIENT)
Dept: SURGERY | Facility: HOSPITAL | Age: 75
End: 2025-08-13

## 2025-08-13 DIAGNOSIS — Z01.818 ENCOUNTER FOR PREADMISSION TESTING: Primary | ICD-10-CM

## 2025-08-13 PROCEDURE — 76000 FLUOROSCOPY <1 HR PHYS/QHP: CPT | Performed by: ORTHOPAEDIC SURGERY

## 2025-08-13 PROCEDURE — 94760 N-INVAS EAR/PLS OXIMETRY 1: CPT

## 2025-08-13 PROCEDURE — 4A11X4G MONITORING OF PERIPHERAL NERVOUS ELECTRICAL ACTIVITY, INTRAOPERATIVE, EXTERNAL APPROACH: ICD-10-PCS | Performed by: ORTHOPAEDIC SURGERY

## 2025-08-13 PROCEDURE — 0ST20ZZ RESECTION OF LUMBAR VERTEBRAL DISC, OPEN APPROACH: ICD-10-PCS | Performed by: ORTHOPAEDIC SURGERY

## 2025-08-13 PROCEDURE — 0SG0071 FUSION OF LUMBAR VERTEBRAL JOINT WITH AUTOLOGOUS TISSUE SUBSTITUTE, POSTERIOR APPROACH, POSTERIOR COLUMN, OPEN APPROACH: ICD-10-PCS | Performed by: ORTHOPAEDIC SURGERY

## 2025-08-13 PROCEDURE — 0SG00AJ FUSION OF LUMBAR VERTEBRAL JOINT WITH INTERBODY FUSION DEVICE, POSTERIOR APPROACH, ANTERIOR COLUMN, OPEN APPROACH: ICD-10-PCS | Performed by: ORTHOPAEDIC SURGERY

## 2025-08-13 DEVICE — IMPLANTABLE DEVICE: Type: IMPLANTABLE DEVICE | Site: BACK | Status: FUNCTIONAL

## 2025-08-13 DEVICE — GRAFT BNE SUB 10CC ALLGRFT CANC W/ CORT: Type: IMPLANTABLE DEVICE | Site: BACK | Status: FUNCTIONAL

## 2025-08-13 RX ORDER — HYDROMORPHONE HYDROCHLORIDE 1 MG/ML
0.6 INJECTION, SOLUTION INTRAMUSCULAR; INTRAVENOUS; SUBCUTANEOUS EVERY 5 MIN PRN
Status: DISCONTINUED | OUTPATIENT
Start: 2025-08-13 | End: 2025-08-13 | Stop reason: HOSPADM

## 2025-08-13 RX ORDER — PHENYLEPHRINE HCL 10 MG/ML
VIAL (ML) INJECTION AS NEEDED
Status: DISCONTINUED | OUTPATIENT
Start: 2025-08-13 | End: 2025-08-13 | Stop reason: SURG

## 2025-08-13 RX ORDER — DIPHENHYDRAMINE HYDROCHLORIDE 50 MG/ML
25 INJECTION, SOLUTION INTRAMUSCULAR; INTRAVENOUS EVERY 4 HOURS PRN
Status: DISCONTINUED | OUTPATIENT
Start: 2025-08-13 | End: 2025-08-16

## 2025-08-13 RX ORDER — METHOCARBAMOL 750 MG/1
750 TABLET, FILM COATED ORAL 3 TIMES DAILY
Status: DISCONTINUED | OUTPATIENT
Start: 2025-08-13 | End: 2025-08-16

## 2025-08-13 RX ORDER — OXYCODONE HYDROCHLORIDE 5 MG/1
10 TABLET ORAL ONCE AS NEEDED
Status: COMPLETED | OUTPATIENT
Start: 2025-08-13 | End: 2025-08-13

## 2025-08-13 RX ORDER — VANCOMYCIN HYDROCHLORIDE 1 G/20ML
INJECTION, POWDER, LYOPHILIZED, FOR SOLUTION INTRAVENOUS AS NEEDED
Status: DISCONTINUED | OUTPATIENT
Start: 2025-08-13 | End: 2025-08-13 | Stop reason: HOSPADM

## 2025-08-13 RX ORDER — KETAMINE HYDROCHLORIDE 50 MG/ML
INJECTION, SOLUTION INTRAMUSCULAR; INTRAVENOUS AS NEEDED
Status: DISCONTINUED | OUTPATIENT
Start: 2025-08-13 | End: 2025-08-13 | Stop reason: SURG

## 2025-08-13 RX ORDER — SODIUM CHLORIDE, SODIUM LACTATE, POTASSIUM CHLORIDE, CALCIUM CHLORIDE 600; 310; 30; 20 MG/100ML; MG/100ML; MG/100ML; MG/100ML
INJECTION, SOLUTION INTRAVENOUS CONTINUOUS
Status: DISCONTINUED | OUTPATIENT
Start: 2025-08-13 | End: 2025-08-16

## 2025-08-13 RX ORDER — DIPHENHYDRAMINE HCL 25 MG
25 CAPSULE ORAL EVERY 4 HOURS PRN
Status: DISCONTINUED | OUTPATIENT
Start: 2025-08-13 | End: 2025-08-16

## 2025-08-13 RX ORDER — HYDROMORPHONE HYDROCHLORIDE 1 MG/ML
INJECTION, SOLUTION INTRAMUSCULAR; INTRAVENOUS; SUBCUTANEOUS
Status: COMPLETED
Start: 2025-08-13 | End: 2025-08-13

## 2025-08-13 RX ORDER — MIDAZOLAM HYDROCHLORIDE 1 MG/ML
1 INJECTION INTRAMUSCULAR; INTRAVENOUS EVERY 5 MIN PRN
Status: DISCONTINUED | OUTPATIENT
Start: 2025-08-13 | End: 2025-08-13 | Stop reason: HOSPADM

## 2025-08-13 RX ORDER — SODIUM CHLORIDE, SODIUM LACTATE, POTASSIUM CHLORIDE, CALCIUM CHLORIDE 600; 310; 30; 20 MG/100ML; MG/100ML; MG/100ML; MG/100ML
INJECTION, SOLUTION INTRAVENOUS CONTINUOUS
Status: DISCONTINUED | OUTPATIENT
Start: 2025-08-13 | End: 2025-08-13 | Stop reason: HOSPADM

## 2025-08-13 RX ORDER — POLYETHYLENE GLYCOL 3350 17 G/17G
17 POWDER, FOR SOLUTION ORAL DAILY PRN
Status: DISCONTINUED | OUTPATIENT
Start: 2025-08-13 | End: 2025-08-16

## 2025-08-13 RX ORDER — ONDANSETRON 2 MG/ML
INJECTION INTRAMUSCULAR; INTRAVENOUS AS NEEDED
Status: DISCONTINUED | OUTPATIENT
Start: 2025-08-13 | End: 2025-08-13 | Stop reason: SURG

## 2025-08-13 RX ORDER — DOCUSATE SODIUM 100 MG/1
100 CAPSULE, LIQUID FILLED ORAL 2 TIMES DAILY
Status: DISCONTINUED | OUTPATIENT
Start: 2025-08-13 | End: 2025-08-16

## 2025-08-13 RX ORDER — GABAPENTIN 600 MG/1
600 TABLET ORAL 3 TIMES DAILY
Status: DISCONTINUED | OUTPATIENT
Start: 2025-08-14 | End: 2025-08-16

## 2025-08-13 RX ORDER — SENNOSIDES 8.6 MG
17.2 TABLET ORAL NIGHTLY
Status: DISCONTINUED | OUTPATIENT
Start: 2025-08-13 | End: 2025-08-16

## 2025-08-13 RX ORDER — OXYCODONE HYDROCHLORIDE 5 MG/1
TABLET ORAL
Status: COMPLETED
Start: 2025-08-13 | End: 2025-08-13

## 2025-08-13 RX ORDER — CEFAZOLIN SODIUM 1 G/3ML
INJECTION, POWDER, FOR SOLUTION INTRAMUSCULAR; INTRAVENOUS AS NEEDED
Status: DISCONTINUED | OUTPATIENT
Start: 2025-08-13 | End: 2025-08-13 | Stop reason: SURG

## 2025-08-13 RX ORDER — METHOCARBAMOL 750 MG/1
750 TABLET, FILM COATED ORAL 3 TIMES DAILY
COMMUNITY

## 2025-08-13 RX ORDER — DIPHENHYDRAMINE HYDROCHLORIDE 50 MG/ML
12.5 INJECTION, SOLUTION INTRAMUSCULAR; INTRAVENOUS AS NEEDED
Status: DISCONTINUED | OUTPATIENT
Start: 2025-08-13 | End: 2025-08-13 | Stop reason: HOSPADM

## 2025-08-13 RX ORDER — OXYCODONE HYDROCHLORIDE 5 MG/1
5 TABLET ORAL ONCE AS NEEDED
Status: COMPLETED | OUTPATIENT
Start: 2025-08-13 | End: 2025-08-13

## 2025-08-13 RX ORDER — ROSUVASTATIN CALCIUM 5 MG/1
5 TABLET, COATED ORAL NIGHTLY
Status: DISCONTINUED | OUTPATIENT
Start: 2025-08-14 | End: 2025-08-16

## 2025-08-13 RX ORDER — HYDROMORPHONE HYDROCHLORIDE 1 MG/ML
0.2 INJECTION, SOLUTION INTRAMUSCULAR; INTRAVENOUS; SUBCUTANEOUS EVERY 5 MIN PRN
Status: DISCONTINUED | OUTPATIENT
Start: 2025-08-13 | End: 2025-08-13 | Stop reason: HOSPADM

## 2025-08-13 RX ORDER — BISACODYL 10 MG
10 SUPPOSITORY, RECTAL RECTAL
Status: DISCONTINUED | OUTPATIENT
Start: 2025-08-13 | End: 2025-08-16

## 2025-08-13 RX ORDER — DIAZEPAM 10 MG/2ML
5 INJECTION, SOLUTION INTRAMUSCULAR; INTRAVENOUS ONCE
Status: COMPLETED | OUTPATIENT
Start: 2025-08-13 | End: 2025-08-13

## 2025-08-13 RX ORDER — METOCLOPRAMIDE HYDROCHLORIDE 5 MG/ML
10 INJECTION INTRAMUSCULAR; INTRAVENOUS EVERY 8 HOURS PRN
Status: DISCONTINUED | OUTPATIENT
Start: 2025-08-13 | End: 2025-08-13 | Stop reason: HOSPADM

## 2025-08-13 RX ORDER — HYDROMORPHONE HYDROCHLORIDE 1 MG/ML
0.4 INJECTION, SOLUTION INTRAMUSCULAR; INTRAVENOUS; SUBCUTANEOUS EVERY 5 MIN PRN
Status: DISCONTINUED | OUTPATIENT
Start: 2025-08-13 | End: 2025-08-13 | Stop reason: HOSPADM

## 2025-08-13 RX ORDER — SODIUM PHOSPHATE, DIBASIC AND SODIUM PHOSPHATE, MONOBASIC 7; 19 G/230ML; G/230ML
1 ENEMA RECTAL ONCE AS NEEDED
Status: DISCONTINUED | OUTPATIENT
Start: 2025-08-13 | End: 2025-08-16

## 2025-08-13 RX ORDER — ONDANSETRON 2 MG/ML
4 INJECTION INTRAMUSCULAR; INTRAVENOUS EVERY 6 HOURS PRN
Status: DISCONTINUED | OUTPATIENT
Start: 2025-08-13 | End: 2025-08-16

## 2025-08-13 RX ORDER — LIDOCAINE HYDROCHLORIDE 10 MG/ML
INJECTION, SOLUTION EPIDURAL; INFILTRATION; INTRACAUDAL; PERINEURAL AS NEEDED
Status: DISCONTINUED | OUTPATIENT
Start: 2025-08-13 | End: 2025-08-13 | Stop reason: SURG

## 2025-08-13 RX ORDER — HYDROMORPHONE HYDROCHLORIDE 1 MG/ML
0.8 INJECTION, SOLUTION INTRAMUSCULAR; INTRAVENOUS; SUBCUTANEOUS EVERY 2 HOUR PRN
Status: DISCONTINUED | OUTPATIENT
Start: 2025-08-13 | End: 2025-08-16

## 2025-08-13 RX ORDER — DEXAMETHASONE SODIUM PHOSPHATE 4 MG/ML
VIAL (ML) INJECTION AS NEEDED
Status: DISCONTINUED | OUTPATIENT
Start: 2025-08-13 | End: 2025-08-13 | Stop reason: SURG

## 2025-08-13 RX ORDER — HYDROMORPHONE HYDROCHLORIDE 1 MG/ML
INJECTION, SOLUTION INTRAMUSCULAR; INTRAVENOUS; SUBCUTANEOUS AS NEEDED
Status: DISCONTINUED | OUTPATIENT
Start: 2025-08-13 | End: 2025-08-13 | Stop reason: SURG

## 2025-08-13 RX ORDER — METOCLOPRAMIDE HYDROCHLORIDE 5 MG/ML
10 INJECTION INTRAMUSCULAR; INTRAVENOUS EVERY 8 HOURS PRN
Status: DISCONTINUED | OUTPATIENT
Start: 2025-08-13 | End: 2025-08-16

## 2025-08-13 RX ORDER — LIDOCAINE HYDROCHLORIDE ANHYDROUS AND DEXTROSE MONOHYDRATE .8; 5 G/100ML; G/100ML
INJECTION, SOLUTION INTRAVENOUS CONTINUOUS PRN
Status: DISCONTINUED | OUTPATIENT
Start: 2025-08-13 | End: 2025-08-13 | Stop reason: SURG

## 2025-08-13 RX ORDER — TRANEXAMIC ACID 10 MG/ML
INJECTION, SOLUTION INTRAVENOUS AS NEEDED
Status: DISCONTINUED | OUTPATIENT
Start: 2025-08-13 | End: 2025-08-13 | Stop reason: SURG

## 2025-08-13 RX ORDER — ONDANSETRON 2 MG/ML
4 INJECTION INTRAMUSCULAR; INTRAVENOUS EVERY 6 HOURS PRN
Status: DISCONTINUED | OUTPATIENT
Start: 2025-08-13 | End: 2025-08-13 | Stop reason: HOSPADM

## 2025-08-13 RX ORDER — HYDROMORPHONE HYDROCHLORIDE 1 MG/ML
0.4 INJECTION, SOLUTION INTRAMUSCULAR; INTRAVENOUS; SUBCUTANEOUS EVERY 2 HOUR PRN
Status: DISCONTINUED | OUTPATIENT
Start: 2025-08-13 | End: 2025-08-16

## 2025-08-13 RX ORDER — EPHEDRINE SULFATE 50 MG/ML
INJECTION INTRAVENOUS AS NEEDED
Status: DISCONTINUED | OUTPATIENT
Start: 2025-08-13 | End: 2025-08-13 | Stop reason: SURG

## 2025-08-13 RX ORDER — METHOCARBAMOL 100 MG/ML
INJECTION, SOLUTION INTRAMUSCULAR; INTRAVENOUS
Status: DISCONTINUED
Start: 2025-08-13 | End: 2025-08-13 | Stop reason: WASHOUT

## 2025-08-13 RX ORDER — ACETAMINOPHEN 10 MG/ML
INJECTION, SOLUTION INTRAVENOUS AS NEEDED
Status: DISCONTINUED | OUTPATIENT
Start: 2025-08-13 | End: 2025-08-13 | Stop reason: SURG

## 2025-08-13 RX ORDER — DIAZEPAM 10 MG/2ML
INJECTION, SOLUTION INTRAMUSCULAR; INTRAVENOUS
Status: COMPLETED
Start: 2025-08-13 | End: 2025-08-13

## 2025-08-13 RX ORDER — NALOXONE HYDROCHLORIDE 0.4 MG/ML
0.08 INJECTION, SOLUTION INTRAMUSCULAR; INTRAVENOUS; SUBCUTANEOUS AS NEEDED
Status: DISCONTINUED | OUTPATIENT
Start: 2025-08-13 | End: 2025-08-13 | Stop reason: HOSPADM

## 2025-08-13 RX ORDER — METHOCARBAMOL 100 MG/ML
INJECTION, SOLUTION INTRAMUSCULAR; INTRAVENOUS AS NEEDED
Status: DISCONTINUED | OUTPATIENT
Start: 2025-08-13 | End: 2025-08-13 | Stop reason: SURG

## 2025-08-13 RX ORDER — MEPERIDINE HYDROCHLORIDE 25 MG/ML
12.5 INJECTION INTRAMUSCULAR; INTRAVENOUS; SUBCUTANEOUS AS NEEDED
Status: DISCONTINUED | OUTPATIENT
Start: 2025-08-13 | End: 2025-08-13 | Stop reason: HOSPADM

## 2025-08-13 RX ORDER — OXYCODONE AND ACETAMINOPHEN 10; 325 MG/1; MG/1
1-2 TABLET ORAL
COMMUNITY
Start: 2025-08-11

## 2025-08-13 RX ORDER — OXYCODONE AND ACETAMINOPHEN 10; 325 MG/1; MG/1
1 TABLET ORAL EVERY 4 HOURS PRN
Status: DISCONTINUED | OUTPATIENT
Start: 2025-08-13 | End: 2025-08-16

## 2025-08-13 RX ORDER — ACETAMINOPHEN 500 MG
1000 TABLET ORAL ONCE
Status: DISCONTINUED | OUTPATIENT
Start: 2025-08-13 | End: 2025-08-13 | Stop reason: HOSPADM

## 2025-08-13 RX ORDER — OXYCODONE AND ACETAMINOPHEN 10; 325 MG/1; MG/1
2 TABLET ORAL EVERY 4 HOURS PRN
Status: DISCONTINUED | OUTPATIENT
Start: 2025-08-13 | End: 2025-08-16

## 2025-08-13 RX ADMIN — SODIUM CHLORIDE, SODIUM LACTATE, POTASSIUM CHLORIDE, CALCIUM CHLORIDE: 600; 310; 30; 20 INJECTION, SOLUTION INTRAVENOUS at 13:02:00

## 2025-08-13 RX ADMIN — LIDOCAINE HYDROCHLORIDE ANHYDROUS AND DEXTROSE MONOHYDRATE 2 MG/KG/HR: .8; 5 INJECTION, SOLUTION INTRAVENOUS at 10:20:00

## 2025-08-13 RX ADMIN — EPHEDRINE SULFATE 5 MG: 50 INJECTION INTRAVENOUS at 10:46:00

## 2025-08-13 RX ADMIN — HYDROMORPHONE HYDROCHLORIDE 0.5 MG: 1 INJECTION, SOLUTION INTRAMUSCULAR; INTRAVENOUS; SUBCUTANEOUS at 12:59:00

## 2025-08-13 RX ADMIN — ONDANSETRON 4 MG: 2 INJECTION INTRAMUSCULAR; INTRAVENOUS at 11:04:00

## 2025-08-13 RX ADMIN — METHOCARBAMOL 750 MG: 100 INJECTION, SOLUTION INTRAMUSCULAR; INTRAVENOUS at 11:04:00

## 2025-08-13 RX ADMIN — LIDOCAINE HYDROCHLORIDE 50 MG: 10 INJECTION, SOLUTION EPIDURAL; INFILTRATION; INTRACAUDAL; PERINEURAL at 10:15:00

## 2025-08-13 RX ADMIN — PHENYLEPHRINE HCL 100 MCG: 10 MG/ML VIAL (ML) INJECTION at 11:04:00

## 2025-08-13 RX ADMIN — SODIUM CHLORIDE, SODIUM LACTATE, POTASSIUM CHLORIDE, CALCIUM CHLORIDE: 600; 310; 30; 20 INJECTION, SOLUTION INTRAVENOUS at 12:10:00

## 2025-08-13 RX ADMIN — EPHEDRINE SULFATE 5 MG: 50 INJECTION INTRAVENOUS at 10:32:00

## 2025-08-13 RX ADMIN — KETAMINE HYDROCHLORIDE 15 MG: 50 INJECTION, SOLUTION INTRAMUSCULAR; INTRAVENOUS at 10:23:00

## 2025-08-13 RX ADMIN — SODIUM CHLORIDE, SODIUM LACTATE, POTASSIUM CHLORIDE, CALCIUM CHLORIDE: 600; 310; 30; 20 INJECTION, SOLUTION INTRAVENOUS at 10:13:00

## 2025-08-13 RX ADMIN — HYDROMORPHONE HYDROCHLORIDE 0.5 MG: 1 INJECTION, SOLUTION INTRAMUSCULAR; INTRAVENOUS; SUBCUTANEOUS at 10:57:00

## 2025-08-13 RX ADMIN — TRANEXAMIC ACID 1000 MG: 10 INJECTION, SOLUTION INTRAVENOUS at 10:17:00

## 2025-08-13 RX ADMIN — CEFAZOLIN SODIUM 2 G: 1 INJECTION, POWDER, FOR SOLUTION INTRAMUSCULAR; INTRAVENOUS at 10:18:00

## 2025-08-13 RX ADMIN — DEXAMETHASONE SODIUM PHOSPHATE 8 MG: 4 MG/ML VIAL (ML) INJECTION at 10:20:00

## 2025-08-13 RX ADMIN — ACETAMINOPHEN 1000 MG: 10 INJECTION, SOLUTION INTRAVENOUS at 11:04:00

## 2025-08-14 PROCEDURE — 97161 PT EVAL LOW COMPLEX 20 MIN: CPT

## 2025-08-14 PROCEDURE — 97116 GAIT TRAINING THERAPY: CPT

## 2025-08-14 PROCEDURE — 97165 OT EVAL LOW COMPLEX 30 MIN: CPT

## 2025-08-14 PROCEDURE — 97530 THERAPEUTIC ACTIVITIES: CPT

## 2025-08-15 LAB
ANION GAP SERPL CALC-SCNC: 5 MMOL/L (ref 0–18)
BASOPHILS # BLD AUTO: 0.05 X10(3) UL (ref 0–0.2)
BASOPHILS NFR BLD AUTO: 0.4 %
BUN BLD-MCNC: 15 MG/DL (ref 9–23)
CALCIUM BLD-MCNC: 8.7 MG/DL (ref 8.7–10.6)
CHLORIDE SERPL-SCNC: 106 MMOL/L (ref 98–112)
CO2 SERPL-SCNC: 28 MMOL/L (ref 21–32)
CREAT BLD-MCNC: 0.86 MG/DL (ref 0.7–1.3)
EGFRCR SERPLBLD CKD-EPI 2021: 92 ML/MIN/1.73M2 (ref 60–?)
EOSINOPHIL # BLD AUTO: 0.14 X10(3) UL (ref 0–0.7)
EOSINOPHIL NFR BLD AUTO: 1.2 %
ERYTHROCYTE [DISTWIDTH] IN BLOOD BY AUTOMATED COUNT: 12.7 %
GLUCOSE BLD-MCNC: 101 MG/DL (ref 70–99)
HCT VFR BLD AUTO: 36.7 % (ref 39–53)
HGB BLD-MCNC: 12.6 G/DL (ref 13–17.5)
IMM GRANULOCYTES # BLD AUTO: 0.06 X10(3) UL (ref 0–1)
IMM GRANULOCYTES NFR BLD: 0.5 %
LYMPHOCYTES # BLD AUTO: 3.23 X10(3) UL (ref 1–4)
LYMPHOCYTES NFR BLD AUTO: 26.6 %
MCH RBC QN AUTO: 34 PG (ref 26–34)
MCHC RBC AUTO-ENTMCNC: 34.3 G/DL (ref 31–37)
MCV RBC AUTO: 98.9 FL (ref 80–100)
MONOCYTES # BLD AUTO: 1.11 X10(3) UL (ref 0.1–1)
MONOCYTES NFR BLD AUTO: 9.1 %
NEUTROPHILS # BLD AUTO: 7.55 X10 (3) UL (ref 1.5–7.7)
NEUTROPHILS # BLD AUTO: 7.55 X10(3) UL (ref 1.5–7.7)
NEUTROPHILS NFR BLD AUTO: 62.2 %
OSMOLALITY SERPL CALC.SUM OF ELEC: 289 MOSM/KG (ref 275–295)
PLATELET # BLD AUTO: 172 10(3)UL (ref 150–450)
POTASSIUM SERPL-SCNC: 3.9 MMOL/L (ref 3.5–5.1)
RBC # BLD AUTO: 3.71 X10(6)UL (ref 3.8–5.8)
SODIUM SERPL-SCNC: 139 MMOL/L (ref 136–145)
WBC # BLD AUTO: 12.1 X10(3) UL (ref 4–11)

## 2025-08-15 PROCEDURE — 97116 GAIT TRAINING THERAPY: CPT

## 2025-08-15 PROCEDURE — 97535 SELF CARE MNGMENT TRAINING: CPT

## 2025-08-15 PROCEDURE — 80048 BASIC METABOLIC PNL TOTAL CA: CPT | Performed by: ORTHOPAEDIC SURGERY

## 2025-08-15 PROCEDURE — 85025 COMPLETE CBC W/AUTO DIFF WBC: CPT | Performed by: ORTHOPAEDIC SURGERY

## 2025-08-15 PROCEDURE — 97530 THERAPEUTIC ACTIVITIES: CPT

## 2025-08-16 VITALS
BODY MASS INDEX: 28.54 KG/M2 | OXYGEN SATURATION: 91 % | HEIGHT: 65 IN | RESPIRATION RATE: 20 BRPM | SYSTOLIC BLOOD PRESSURE: 120 MMHG | TEMPERATURE: 98 F | DIASTOLIC BLOOD PRESSURE: 73 MMHG | HEART RATE: 68 BPM | WEIGHT: 171.31 LBS

## 2025-08-16 LAB
ERYTHROCYTE [DISTWIDTH] IN BLOOD BY AUTOMATED COUNT: 12.6 %
HCT VFR BLD AUTO: 34.1 % (ref 39–53)
HGB BLD-MCNC: 11.9 G/DL (ref 13–17.5)
MCH RBC QN AUTO: 34.7 PG (ref 26–34)
MCHC RBC AUTO-ENTMCNC: 34.9 G/DL (ref 31–37)
MCV RBC AUTO: 99.4 FL (ref 80–100)
PLATELET # BLD AUTO: 169 10(3)UL (ref 150–450)
RBC # BLD AUTO: 3.43 X10(6)UL (ref 3.8–5.8)
WBC # BLD AUTO: 9.8 X10(3) UL (ref 4–11)

## 2025-08-16 PROCEDURE — 85027 COMPLETE CBC AUTOMATED: CPT | Performed by: HOSPITALIST

## 2025-08-16 PROCEDURE — 97530 THERAPEUTIC ACTIVITIES: CPT

## 2025-08-16 PROCEDURE — 97116 GAIT TRAINING THERAPY: CPT

## 2025-08-16 PROCEDURE — 97112 NEUROMUSCULAR REEDUCATION: CPT

## (undated) DEVICE — SPONGE GZ 4X4IN COT 12 PLY TYP VII WVN C

## (undated) DEVICE — Device

## (undated) DEVICE — FORCEPS BPLR LAT IF DISP

## (undated) DEVICE — MONITORING NEUROPHYSIOLOGICAL

## (undated) DEVICE — MARKER SKIN PREP RESIST STRL

## (undated) DEVICE — PACK LAMINECTOMY

## (undated) DEVICE — ANTISEPTIC 4OZ 70% ISO ALC

## (undated) DEVICE — GOWN SUR 2XL LEV 4 BLU W/ WHT V NK BND AERO

## (undated) DEVICE — WRAP THERAPEUTIC BACK WO GEL P

## (undated) DEVICE — COVER TBL W44XL90IN POLYETH UNIV DISP

## (undated) DEVICE — SYRINGE MED 10ML LL TIP W/O SFTY DISP

## (undated) DEVICE — SUT MCRYL 3-0 27IN ABSRB UD 19MM PS-2 3/8

## (undated) DEVICE — SUT VCRL 0 18IN CT-1 ABSRB VLT CR L36MM 1/2

## (undated) DEVICE — BUR SUR 3.8MM PRECIS NEURO MTCH HD

## (undated) DEVICE — SUT COAT VCRL+ 0 27IN CT-1 ABSRB VLT ANTIBACT

## (undated) DEVICE — SOLUTION IRRIG 1000ML 0.9% NACL USP BTL

## (undated) DEVICE — APPLICATOR PREP 26ML CHG 2% ISO ALC 70%

## (undated) DEVICE — ZZ-DUP SUB 399816 - SYRINGE 3ML LL

## (undated) DEVICE — MODULE POS LIF PTP 2.0

## (undated) DEVICE — HEMOSTAT KIT SURGIFLO THROM 8ML

## (undated) DEVICE — DRAPE C ARM TRNSPAR POLY PROTCT BARR FOR UNIV

## (undated) DEVICE — SUT COAT VCRL+ 2-0 18IN CP-2 ABSRB UD ANTIBAC

## (undated) DEVICE — IMPLANTABLE DEVICE
Type: IMPLANTABLE DEVICE | Site: BACK | Status: NON-FUNCTIONAL
Removed: 2025-08-13

## (undated) DEVICE — APPLICATOR PREP 26ML W/ ST SOL CHLORAPREP

## (undated) DEVICE — GLOVE SUR 8 SENSICARE PI PIP CRM PWD F

## (undated) DEVICE — COVER MAYO STD XL W30XL57IN STD FLX FAB

## (undated) DEVICE — MODULE SPNL ACCS LIF PTP 2.0

## (undated) DEVICE — GLOVE SUR 8.5 SENSICARE PI PIP CRM PWD F

## (undated) DEVICE — COVER LT HNDL RIG FOR SUR CAM DISP

## (undated) DEVICE — GLOVE SUR 8.5 SENSICARE PI PIP GRN PWD F

## (undated) DEVICE — SLEEVE COMPR MD KNEE LEN SGL USE KENDALL SCD

## (undated) NOTE — ED AVS SNAPSHOT
Parul Jackman   MRN: [id number]    Department:  [facility] Emergency Department   Date of Visit:  6/25/2019           Disclosure     Insurance plans vary and the physician(s) referred by the ER may not be covered by your plan.  Please contact your tell this physician (or your personal doctor if your instructions are to return to your personal doctor) about any new or lasting problems. The primary care or specialist physician will see patients referred from the [facility] Emergency Department.  Luis Martines

## (undated) NOTE — LETTER
19      RE: Daniel Rossi     : 1950    Dear Dr. Leonela Hardin    This letter is to inform you that your patient is being scheduled for surgery with Dr. Melecio Pang on 2019 at [facility]. We have asked the patient to contact your office to